# Patient Record
Sex: FEMALE | Race: BLACK OR AFRICAN AMERICAN | NOT HISPANIC OR LATINO | URBAN - METROPOLITAN AREA
[De-identification: names, ages, dates, MRNs, and addresses within clinical notes are randomized per-mention and may not be internally consistent; named-entity substitution may affect disease eponyms.]

---
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
## 2018-07-05 ENCOUNTER — TRANSCRIPTION ENCOUNTER (OUTPATIENT)
Age: 18
End: 2018-07-05

## 2018-12-21 ENCOUNTER — APPOINTMENT (OUTPATIENT)
Dept: ANTEPARTUM | Facility: CLINIC | Age: 18
End: 2018-12-21

## 2019-02-01 ENCOUNTER — APPOINTMENT (OUTPATIENT)
Age: 19
End: 2019-02-01

## 2019-02-15 ENCOUNTER — APPOINTMENT (OUTPATIENT)
Age: 19
End: 2019-02-15
Payer: MEDICAID

## 2019-02-15 ENCOUNTER — ASOB RESULT (OUTPATIENT)
Age: 19
End: 2019-02-15

## 2019-02-15 PROCEDURE — 76817 TRANSVAGINAL US OBSTETRIC: CPT

## 2019-02-15 PROCEDURE — 76811 OB US DETAILED SNGL FETUS: CPT

## 2019-02-24 ENCOUNTER — OUTPATIENT (OUTPATIENT)
Dept: INPATIENT UNIT | Facility: HOSPITAL | Age: 19
LOS: 1 days | End: 2019-02-24
Payer: MEDICAID

## 2019-02-24 VITALS — DIASTOLIC BLOOD PRESSURE: 53 MMHG | HEART RATE: 88 BPM | SYSTOLIC BLOOD PRESSURE: 99 MMHG

## 2019-02-24 VITALS — SYSTOLIC BLOOD PRESSURE: 118 MMHG | DIASTOLIC BLOOD PRESSURE: 54 MMHG | HEART RATE: 90 BPM

## 2019-02-24 DIAGNOSIS — O47.03 FALSE LABOR BEFORE 37 COMPLETED WEEKS OF GESTATION, THIRD TRIMESTER: ICD-10-CM

## 2019-02-24 PROCEDURE — G0463: CPT

## 2019-02-24 PROCEDURE — 59025 FETAL NON-STRESS TEST: CPT

## 2019-02-24 NOTE — OB PROVIDER TRIAGE NOTE - NSOBPROVIDERNOTE_OBGYN_ALL_OB_FT
17 y/o  at 23w1d evaluated for an isolated episode of vaginal spotting.   - FHR WNL   - no uterine activity on TOCO   - isolated incident with no other obstetric or general symptoms or recurrence     Will discharge patient home with precautions. Will follow up outpatient at next [facility] visit.     d/w Dr. Odell

## 2019-02-24 NOTE — OB PROVIDER TRIAGE NOTE - HISTORY OF PRESENT ILLNESS
19 y/o  at 23w1d presenting to labor and delivery with one isolated incident of vaginal spotting earlier today, she noticed it on tissue when wiping. Denies abdominal cramping/pain and leakage of fluid.   Prenatal course otherwise uncomplicated.     ObGynHx: eTOP x3  PMH/PSH: none  Allergies: NKDA  Meds: PNV

## 2019-02-24 NOTE — OB PROVIDER TRIAGE NOTE - NSHPPHYSICALEXAM_GEN_ALL_CORE
Vital Signs Last 24 Hrs  T(C): 36.8 (24 Feb 2019 17:30), Max: 36.8 (24 Feb 2019 17:30)  T(F): 98.2 (24 Feb 2019 17:30), Max: 98.2 (24 Feb 2019 17:30)  HR: 90 (24 Feb 2019 17:30) (90 - 90)  BP: 123/56 (24 Feb 2019 17:30) (118/54 - 123/56)  RR: 18 (24 Feb 2019 17:30) (18 - 18)    FHR: 144  [place]: no uterine activity

## 2019-03-01 ENCOUNTER — APPOINTMENT (OUTPATIENT)
Age: 19
End: 2019-03-01

## 2019-03-17 ENCOUNTER — OUTPATIENT (OUTPATIENT)
Dept: INPATIENT UNIT | Facility: HOSPITAL | Age: 19
LOS: 1 days | End: 2019-03-17
Payer: MEDICAID

## 2019-03-17 VITALS — DIASTOLIC BLOOD PRESSURE: 67 MMHG | SYSTOLIC BLOOD PRESSURE: 123 MMHG | HEART RATE: 110 BPM

## 2019-03-17 VITALS — SYSTOLIC BLOOD PRESSURE: 113 MMHG | HEART RATE: 98 BPM | DIASTOLIC BLOOD PRESSURE: 71 MMHG

## 2019-03-17 DIAGNOSIS — O47.02 FALSE LABOR BEFORE 37 COMPLETED WEEKS OF GESTATION, SECOND TRIMESTER: ICD-10-CM

## 2019-03-17 LAB
APPEARANCE UR: ABNORMAL
BACTERIA # UR AUTO: ABNORMAL
BILIRUB UR-MCNC: ABNORMAL
COLOR SPEC: YELLOW — SIGNIFICANT CHANGE UP
DIFF PNL FLD: ABNORMAL
EPI CELLS # UR: SIGNIFICANT CHANGE UP
GLUCOSE UR QL: NEGATIVE MG/DL — SIGNIFICANT CHANGE UP
KETONES UR-MCNC: ABNORMAL
LEUKOCYTE ESTERASE UR-ACNC: ABNORMAL
NITRITE UR-MCNC: POSITIVE
PH UR: 6 — SIGNIFICANT CHANGE UP (ref 5–8)
PROT UR-MCNC: 100 MG/DL
RBC CASTS # UR COMP ASSIST: NEGATIVE /HPF — SIGNIFICANT CHANGE UP (ref 0–4)
SP GR SPEC: 1.02 — SIGNIFICANT CHANGE UP (ref 1.01–1.02)
UROBILINOGEN FLD QL: 8 MG/DL
WBC UR QL: >50

## 2019-03-17 PROCEDURE — 59025 FETAL NON-STRESS TEST: CPT

## 2019-03-17 PROCEDURE — 81001 URINALYSIS AUTO W/SCOPE: CPT

## 2019-03-17 PROCEDURE — 87086 URINE CULTURE/COLONY COUNT: CPT

## 2019-03-17 PROCEDURE — 76775 US EXAM ABDO BACK WALL LIM: CPT | Mod: 26

## 2019-03-17 PROCEDURE — 76775 US EXAM ABDO BACK WALL LIM: CPT

## 2019-03-17 PROCEDURE — G0463: CPT

## 2019-03-17 PROCEDURE — 87186 SC STD MICRODIL/AGAR DIL: CPT

## 2019-03-17 RX ORDER — CEPHALEXIN 500 MG
1 CAPSULE ORAL
Qty: 28 | Refills: 0
Start: 2019-03-17 | End: 2019-03-23

## 2019-03-17 RX ORDER — SODIUM CHLORIDE 9 MG/ML
1000 INJECTION, SOLUTION INTRAVENOUS ONCE
Qty: 0 | Refills: 0 | Status: DISCONTINUED | OUTPATIENT
Start: 2019-03-17 | End: 2019-04-01

## 2019-03-17 NOTE — OB RN TRIAGE NOTE - NS_TRIAGEADDITIONAL COMMENTS_OBGYN_ALL_OB_FT
Pt complaining of abdominal pain. UA, Urine culture sent. Contractions noted, IV 1 liter bolus of LR infusing. [facility]. 1300 [facility], Mild Left hydronephrosis noted, Ua results Positive for UTI. Pt discharged home as per Dr Odell with RX for Keflex called in to pts pharmacy. Instruction for PTL and Antibiotic use gjiven. Pt states understanding of all.

## 2019-03-17 NOTE — OB RN TRIAGE NOTE - CHIEF COMPLAINT QUOTE
im having low abdominal pain x 3 days im having low abdominal pain x 3 days, also throwing up everytime she eats x 3 days

## 2019-03-20 ENCOUNTER — APPOINTMENT (OUTPATIENT)
Age: 19
End: 2019-03-20
Payer: MEDICAID

## 2019-03-20 ENCOUNTER — ASOB RESULT (OUTPATIENT)
Age: 19
End: 2019-03-20

## 2019-03-20 PROCEDURE — 76816 OB US FOLLOW-UP PER FETUS: CPT

## 2019-04-18 ENCOUNTER — ASOB RESULT (OUTPATIENT)
Age: 19
End: 2019-04-18

## 2019-04-18 ENCOUNTER — APPOINTMENT (OUTPATIENT)
Dept: ANTEPARTUM | Facility: CLINIC | Age: 19
End: 2019-04-18
Payer: MEDICAID

## 2019-04-18 PROCEDURE — 76816 OB US FOLLOW-UP PER FETUS: CPT

## 2019-04-18 PROCEDURE — 76819 FETAL BIOPHYS PROFIL W/O NST: CPT

## 2019-04-24 ENCOUNTER — APPOINTMENT (OUTPATIENT)
Age: 19
End: 2019-04-24

## 2019-05-08 ENCOUNTER — APPOINTMENT (OUTPATIENT)
Age: 19
End: 2019-05-08
Payer: MEDICAID

## 2019-05-08 ENCOUNTER — ASOB RESULT (OUTPATIENT)
Age: 19
End: 2019-05-08

## 2019-05-08 PROCEDURE — 76819 FETAL BIOPHYS PROFIL W/O NST: CPT

## 2019-05-08 PROCEDURE — 76816 OB US FOLLOW-UP PER FETUS: CPT

## 2019-06-05 ENCOUNTER — OUTPATIENT (OUTPATIENT)
Dept: INPATIENT UNIT | Facility: HOSPITAL | Age: 19
LOS: 1 days | End: 2019-06-05
Payer: MEDICAID

## 2019-06-05 VITALS
OXYGEN SATURATION: 97 % | TEMPERATURE: 98 F | DIASTOLIC BLOOD PRESSURE: 65 MMHG | RESPIRATION RATE: 18 BRPM | SYSTOLIC BLOOD PRESSURE: 110 MMHG | HEART RATE: 95 BPM

## 2019-06-05 VITALS — DIASTOLIC BLOOD PRESSURE: 65 MMHG | HEART RATE: 106 BPM | SYSTOLIC BLOOD PRESSURE: 110 MMHG | OXYGEN SATURATION: 97 %

## 2019-06-05 DIAGNOSIS — O47.03 FALSE LABOR BEFORE 37 COMPLETED WEEKS OF GESTATION, THIRD TRIMESTER: ICD-10-CM

## 2019-06-05 PROCEDURE — G0463: CPT

## 2019-06-05 PROCEDURE — 59025 FETAL NON-STRESS TEST: CPT

## 2019-06-05 PROCEDURE — 76819 FETAL BIOPHYS PROFIL W/O NST: CPT

## 2019-06-05 PROCEDURE — 76819 FETAL BIOPHYS PROFIL W/O NST: CPT | Mod: 26

## 2019-06-05 PROCEDURE — 76815 OB US LIMITED FETUS(S): CPT | Mod: 26

## 2019-06-05 PROCEDURE — 76815 OB US LIMITED FETUS(S): CPT

## 2019-06-05 NOTE — OB PROVIDER TRIAGE NOTE - HISTORY OF PRESENT ILLNESS
19yo  @ 36+6 by LMP 16 w/ anemia of pregnancy presents with decreased FM. Pt states that she did not feel baby until 3pm today, which is abnormal for baby (normally feels baby kicking ass soon as she wakes up). She states that she does has FM now, but denies vaginal bleeding, LOF, and contractions. Denies any falls or recent stressful events. No other complaints.   PMH: denies  PSH: denies  Meds: prenatal vitamins, Fe   Allergies: NKDA  Gyn Hx: normal PAPs, no cysts, no fibroids  OB Hx: 4 SAB as per patient

## 2019-06-05 NOTE — OB PROVIDER TRIAGE NOTE - NSOBPROVIDERNOTE_OBGYN_ALL_OB_FT
17yo  @ 36+6 by LMP 18 w/ anemia of pregnancy presents with decreased FM.  - FHT and toco  - BPP  - ice chips, repositioning 17yo  @ 36+6 by LMP 16 w/ anemia of pregnancy presents with decreased FM.  - FHT and toco  - BPP  - ice chips, repositioning    Attending  pt evaluated, FHR category 1  BPP   pt will be dc home for routine f/u

## 2019-06-21 ENCOUNTER — INPATIENT (INPATIENT)
Facility: HOSPITAL | Age: 19
LOS: 3 days | Discharge: HOME | DRG: 819 | End: 2019-06-25
Attending: OBSTETRICS & GYNECOLOGY | Admitting: OBSTETRICS & GYNECOLOGY
Payer: MEDICAID

## 2019-06-21 VITALS
WEIGHT: 250 LBS | HEIGHT: 62 IN | SYSTOLIC BLOOD PRESSURE: 130 MMHG | HEART RATE: 94 BPM | DIASTOLIC BLOOD PRESSURE: 77 MMHG | TEMPERATURE: 98 F | RESPIRATION RATE: 16 BRPM

## 2019-06-21 DIAGNOSIS — O26.893 OTHER SPECIFIED PREGNANCY RELATED CONDITIONS, THIRD TRIMESTER: ICD-10-CM

## 2019-06-21 DIAGNOSIS — Z3A.39 39 WEEKS GESTATION OF PREGNANCY: ICD-10-CM

## 2019-06-21 LAB
BASOPHILS # BLD AUTO: 0 K/UL — SIGNIFICANT CHANGE UP (ref 0–0.2)
BASOPHILS NFR BLD AUTO: 0.2 % — SIGNIFICANT CHANGE UP (ref 0–2)
BLD GP AB SCN SERPL QL: SIGNIFICANT CHANGE UP
EOSINOPHIL # BLD AUTO: 0.3 K/UL — SIGNIFICANT CHANGE UP (ref 0–0.5)
EOSINOPHIL NFR BLD AUTO: 3 % — SIGNIFICANT CHANGE UP (ref 0–6)
HCT VFR BLD CALC: 30.4 % — LOW (ref 37–47)
HGB BLD-MCNC: 9.4 G/DL — LOW (ref 12–16)
LYMPHOCYTES # BLD AUTO: 2 K/UL — SIGNIFICANT CHANGE UP (ref 1–4.8)
LYMPHOCYTES # BLD AUTO: 20.9 % — SIGNIFICANT CHANGE UP (ref 20–55)
MCHC RBC-ENTMCNC: 23.5 PG — LOW (ref 27–31)
MCHC RBC-ENTMCNC: 30.9 G/DL — LOW (ref 32–36)
MCV RBC AUTO: 76 FL — LOW (ref 81–99)
MONOCYTES # BLD AUTO: 0.7 K/UL — SIGNIFICANT CHANGE UP (ref 0–0.8)
MONOCYTES NFR BLD AUTO: 7.7 % — SIGNIFICANT CHANGE UP (ref 3–10)
NEUTROPHILS # BLD AUTO: 6.5 K/UL — SIGNIFICANT CHANGE UP (ref 1.8–8)
NEUTROPHILS NFR BLD AUTO: 67.7 % — SIGNIFICANT CHANGE UP (ref 37–73)
PLATELET # BLD AUTO: 356 K/UL — SIGNIFICANT CHANGE UP (ref 150–400)
RBC # BLD: 4 M/UL — LOW (ref 4.4–5.2)
RBC # FLD: 20 % — HIGH (ref 11–15.6)
TYPE + AB SCN PNL BLD: SIGNIFICANT CHANGE UP
WBC # BLD: 9.6 K/UL — SIGNIFICANT CHANGE UP (ref 4.8–10.8)
WBC # FLD AUTO: 9.6 K/UL — SIGNIFICANT CHANGE UP (ref 4.8–10.8)

## 2019-06-21 RX ORDER — CITRIC ACID/SODIUM CITRATE 300-500 MG
30 SOLUTION, ORAL ORAL ONCE
Refills: 0 | Status: DISCONTINUED | OUTPATIENT
Start: 2019-06-21 | End: 2019-06-23

## 2019-06-21 RX ORDER — BUTORPHANOL TARTRATE 2 MG/ML
1 INJECTION, SOLUTION INTRAMUSCULAR; INTRAVENOUS EVERY 4 HOURS
Refills: 0 | Status: DISCONTINUED | OUTPATIENT
Start: 2019-06-21 | End: 2019-06-25

## 2019-06-21 RX ORDER — BUTORPHANOL TARTRATE 2 MG/ML
2 INJECTION, SOLUTION INTRAMUSCULAR; INTRAVENOUS EVERY 4 HOURS
Refills: 0 | Status: DISCONTINUED | OUTPATIENT
Start: 2019-06-21 | End: 2019-06-25

## 2019-06-21 RX ORDER — AMPICILLIN TRIHYDRATE 250 MG
1 CAPSULE ORAL EVERY 4 HOURS
Refills: 0 | Status: DISCONTINUED | OUTPATIENT
Start: 2019-06-21 | End: 2019-06-23

## 2019-06-21 RX ORDER — OXYTOCIN 10 UNIT/ML
333.33 VIAL (ML) INJECTION
Qty: 20 | Refills: 0 | Status: DISCONTINUED | OUTPATIENT
Start: 2019-06-21 | End: 2019-06-25

## 2019-06-21 RX ORDER — SODIUM CHLORIDE 9 MG/ML
1000 INJECTION, SOLUTION INTRAVENOUS
Refills: 0 | Status: DISCONTINUED | OUTPATIENT
Start: 2019-06-21 | End: 2019-06-23

## 2019-06-21 RX ORDER — AMPICILLIN TRIHYDRATE 250 MG
2 CAPSULE ORAL ONCE
Refills: 0 | Status: COMPLETED | OUTPATIENT
Start: 2019-06-21 | End: 2019-06-21

## 2019-06-21 RX ADMIN — Medication 216 GRAM(S): at 22:56

## 2019-06-21 RX ADMIN — SODIUM CHLORIDE 125 MILLILITER(S): 9 INJECTION, SOLUTION INTRAVENOUS at 22:02

## 2019-06-21 NOTE — OB PROVIDER H&P - HISTORY OF PRESENT ILLNESS
17 y/o  at 39.1w presenting to labor and delivery for elective induction of labor. Pt denies contractions, vaginal bleeding, leakage of fluid. Pt endorses good fetal movement. No complaints at this time. ROS otherwise negative.   Prenatal course uncomplicated.     OBGYNHx: SAB x4 (, , 2016, 2017); denies ovarian cysts, fibroids, STD hx  PMH: denies   PSH: denies  Meds: PNVs  All: NKDA  SH: denies toxic habits x3

## 2019-06-21 NOTE — OB PROVIDER H&P - ASSESSMENT
17 y/o  at 39.1w being admitted for elective induction of labor.  -Admit  -Consent  -FHT and toco  -Admission labs  -GBS positive, will give ampicillin     D/w Dr. Odell

## 2019-06-21 NOTE — OB PROVIDER H&P - NSHPPHYSICALEXAM_GEN_ALL_CORE
Vital Signs Last 24 Hrs  T(C): 36.6 (21 Jun 2019 21:00), Max: 36.6 (21 Jun 2019 21:00)  T(F): 97.9 (21 Jun 2019 21:00), Max: 97.9 (21 Jun 2019 21:00)  HR: 94 (21 Jun 2019 21:00) (94 - 94)  BP: 130/77 (21 Jun 2019 21:00) (130/77 - 130/77)  RR: 16 (21 Jun 2019 21:00) (16 - 16)    Gen: well-appearing, NAD  Abd: soft, NT, ND, gravid     FHT: baseline 135, moderate variability, +accels, no decels, category 1   [place]: sadi irregularly   SVE:  Bedside sono:

## 2019-06-22 LAB
HIV 1 & 2 AB SERPL IA.RAPID: SIGNIFICANT CHANGE UP
HIV 1+2 AB+HIV1 P24 AG SERPL QL IA: SIGNIFICANT CHANGE UP

## 2019-06-22 RX ORDER — SODIUM CHLORIDE 9 MG/ML
1000 INJECTION, SOLUTION INTRAVENOUS
Refills: 0 | Status: DISCONTINUED | OUTPATIENT
Start: 2019-06-22 | End: 2019-06-25

## 2019-06-22 RX ADMIN — Medication 108 GRAM(S): at 07:01

## 2019-06-22 RX ADMIN — SODIUM CHLORIDE 125 MILLILITER(S): 9 INJECTION, SOLUTION INTRAVENOUS at 09:52

## 2019-06-22 RX ADMIN — Medication 108 GRAM(S): at 19:00

## 2019-06-22 RX ADMIN — Medication 108 GRAM(S): at 11:04

## 2019-06-22 RX ADMIN — Medication 108 GRAM(S): at 23:00

## 2019-06-22 RX ADMIN — Medication 108 GRAM(S): at 15:00

## 2019-06-22 RX ADMIN — SODIUM CHLORIDE 125 MILLILITER(S): 9 INJECTION, SOLUTION INTRAVENOUS at 02:10

## 2019-06-22 RX ADMIN — Medication 108 GRAM(S): at 03:00

## 2019-06-22 NOTE — CHART NOTE - NSCHARTNOTEFT_GEN_A_CORE
S:   Patient doing well, resting comfortably     O:     Vital Signs Last 24 Hrs  T(C): 36.5 (22 Jun 2019 02:28), Max: 36.7 (21 Jun 2019 23:40)  T(F): 97.7 (22 Jun 2019 02:28), Max: 98.06 (21 Jun 2019 23:40)  HR: 90 (22 Jun 2019 02:31) (75 - 94)  BP: 118/72 (22 Jun 2019 02:31) (118/72 - 130/77)  RR: 17 (22 Jun 2019 02:28) (16 - 17)      Abdomen: soft, nontender     FHT: baseline 140s, minimal variability, no accelerations at this time, few isolated variable decelerations     [place]: ctx irregular     A/P   FHT cat 2. Patient receiving D5LR at 125cc/hr. Will give additional bolus of fluid. Continue O2 and maternal position changes. Will hold next dose of cytotec for now.     Dr. Odell aware.

## 2019-06-22 NOTE — CHART NOTE - NSCHARTNOTEFT_GEN_A_CORE
pt received an epi  FHR category 1  cervix is now 4-5cm, 100 percent, -1  expectant mgmt for now, I will begin pitocin if there is no significant change within a few hours

## 2019-06-22 NOTE — CHART NOTE - NSCHARTNOTEFT_GEN_A_CORE
19 yo  here at 39 2/7 wks for elective IOL with cytotec. About to receive her first dose of 60 mcg cytotec PO.     Vital Signs Last 24 Hrs  T(C): 36.9 (2019 06:14), Max: 36.9 (2019 06:14)  T(F): 98.42 (2019 06:14), Max: 98.42 (2019 06:14)  HR: 76 (2019 11:36) (75 - 94)  BP: 134/78 (2019 11:36) (99/56 - 134/78)  BP(mean): --  RR: 18 (2019 06:14) (16 - 18)  SpO2: --    FETAL HEART RATE: 135/mod ramakrishna/+accels/ no decels    [place]: irregular contractions    PAIN SCALE (0-10): 0, no epidural    IMPRESSION: pt sadi painlessly with cytotec. FHT cat 1. Continue cytotec induction.

## 2019-06-22 NOTE — CHART NOTE - NSCHARTNOTEFT_GEN_A_CORE
Pt s/p epidural. Had multiple decels after epidural was placed and FSE was placed for discontinuous tracing. .     Vital Signs Last 24 Hrs  T(C): 36.9 (22 Jun 2019 06:14), Max: 36.9 (22 Jun 2019 06:14)  T(F): 98.42 (22 Jun 2019 06:14), Max: 98.42 (22 Jun 2019 06:14)  HR: 70 (22 Jun 2019 20:35) (67 - 155)  BP: 106/55 (22 Jun 2019 20:35) (99/56 - 149/87)  RR: 18 (22 Jun 2019 06:14) (16 - 18)  SpO2: 100% (22 Jun 2019 20:36) (89% - 100%)    FETAL HEART RATE: 150/min ramakrishna/ decels - unable to assess correlation to contractions because of discontinuous tracing and pt repositioning   [place]: q2 min      IMPRESSION: Pt with min ramakrishna but recovered after epidural was placed. Switching her over to d5LR for alternating. She is in lateal position with oxygen on. Will continue to monitor closely.      d/w Dr. Odell

## 2019-06-22 NOTE — CHART NOTE - NSCHARTNOTEFT_GEN_A_CORE
Pt is uncomfortable, complaining of pain with contractions, requesting Epidural Anesthesia to be restarted.  Vital Signs Last 24 Hrs  T(C): 37.0 (22 Jun 2019 22:19), Max: 37.0 (22 Jun 2019 22:19)  T(F): 98.6 (22 Jun 2019 22:19), Max: 98.6 (22 Jun 2019 22:19)  HR: 98 (22 Jun 2019 23:41) (67 - 155)  BP: 133/79 (22 Jun 2019 23:40) (95/46 - 149/87)  RR: 17 (22 Jun 2019 22:19) (17 - 18)  SpO2: 99% (22 Jun 2019 23:41) (89% - 100%)    FETAL HEART RATE: 150/minimal variability, no accels no decels, category 2    [place]: contractions q 2-3 min    CERVICAL EXAM: 6-7/100/-1    PAIN SCALE (0-10): 6-7/10    IMPRESSION: Category 2 tracing, about 30 min ago the tracing was shortly moderate variability, and returned to minimal variability. will monitor closely. Pt is being actively resuscitated with O2 via nonrebreather mask, D5LR IV infusion, position changes. Will continue the current management for ad additional 30 min if no improvement consider change of management plan.  Dr. Odell made aware.

## 2019-06-22 NOTE — CHART NOTE - NSCHARTNOTEFT_GEN_A_CORE
overnight pt had decreased variability  cytotec was held, now restarted  irreg ctx noted  cytotec will continue

## 2019-06-23 ENCOUNTER — TRANSCRIPTION ENCOUNTER (OUTPATIENT)
Age: 19
End: 2019-06-23

## 2019-06-23 LAB
HCT VFR BLD CALC: 25.6 % — LOW (ref 37–47)
HGB BLD-MCNC: 7.8 G/DL — LOW (ref 12–16)
MEV IGG SER-ACNC: 30.2 AU/ML — SIGNIFICANT CHANGE UP
MEV IGG+IGM SER-IMP: POSITIVE — SIGNIFICANT CHANGE UP
T PALLIDUM AB TITR SER: NEGATIVE — SIGNIFICANT CHANGE UP

## 2019-06-23 RX ORDER — ACETAMINOPHEN 500 MG
975 TABLET ORAL
Refills: 0 | Status: DISCONTINUED | OUTPATIENT
Start: 2019-06-23 | End: 2019-06-25

## 2019-06-23 RX ORDER — LANOLIN
1 OINTMENT (GRAM) TOPICAL EVERY 6 HOURS
Refills: 0 | Status: DISCONTINUED | OUTPATIENT
Start: 2019-06-23 | End: 2019-06-25

## 2019-06-23 RX ORDER — AER TRAVELER 0.5 G/1
1 SOLUTION RECTAL; TOPICAL EVERY 4 HOURS
Refills: 0 | Status: DISCONTINUED | OUTPATIENT
Start: 2019-06-23 | End: 2019-06-25

## 2019-06-23 RX ORDER — DIPHENHYDRAMINE HCL 50 MG
25 CAPSULE ORAL EVERY 6 HOURS
Refills: 0 | Status: DISCONTINUED | OUTPATIENT
Start: 2019-06-23 | End: 2019-06-25

## 2019-06-23 RX ORDER — OXYTOCIN 10 UNIT/ML
333.33 VIAL (ML) INJECTION
Qty: 20 | Refills: 0 | Status: DISCONTINUED | OUTPATIENT
Start: 2019-06-23 | End: 2019-06-25

## 2019-06-23 RX ORDER — MAGNESIUM HYDROXIDE 400 MG/1
30 TABLET, CHEWABLE ORAL
Refills: 0 | Status: DISCONTINUED | OUTPATIENT
Start: 2019-06-23 | End: 2019-06-25

## 2019-06-23 RX ORDER — DOCUSATE SODIUM 100 MG
100 CAPSULE ORAL
Refills: 0 | Status: DISCONTINUED | OUTPATIENT
Start: 2019-06-23 | End: 2019-06-25

## 2019-06-23 RX ORDER — KETOROLAC TROMETHAMINE 30 MG/ML
30 SYRINGE (ML) INJECTION ONCE
Refills: 0 | Status: DISCONTINUED | OUTPATIENT
Start: 2019-06-23 | End: 2019-06-25

## 2019-06-23 RX ORDER — TETANUS TOXOID, REDUCED DIPHTHERIA TOXOID AND ACELLULAR PERTUSSIS VACCINE, ADSORBED 5; 2.5; 8; 8; 2.5 [IU]/.5ML; [IU]/.5ML; UG/.5ML; UG/.5ML; UG/.5ML
0.5 SUSPENSION INTRAMUSCULAR ONCE
Refills: 0 | Status: DISCONTINUED | OUTPATIENT
Start: 2019-06-23 | End: 2019-06-25

## 2019-06-23 RX ORDER — PRAMOXINE HYDROCHLORIDE 150 MG/15G
1 AEROSOL, FOAM RECTAL EVERY 4 HOURS
Refills: 0 | Status: DISCONTINUED | OUTPATIENT
Start: 2019-06-23 | End: 2019-06-25

## 2019-06-23 RX ORDER — OXYCODONE HYDROCHLORIDE 5 MG/1
5 TABLET ORAL ONCE
Refills: 0 | Status: DISCONTINUED | OUTPATIENT
Start: 2019-06-23 | End: 2019-06-25

## 2019-06-23 RX ORDER — IBUPROFEN 200 MG
600 TABLET ORAL EVERY 6 HOURS
Refills: 0 | Status: DISCONTINUED | OUTPATIENT
Start: 2019-06-23 | End: 2019-06-25

## 2019-06-23 RX ORDER — GLYCERIN ADULT
1 SUPPOSITORY, RECTAL RECTAL AT BEDTIME
Refills: 0 | Status: DISCONTINUED | OUTPATIENT
Start: 2019-06-23 | End: 2019-06-25

## 2019-06-23 RX ORDER — BENZOCAINE 10 %
1 GEL (GRAM) MUCOUS MEMBRANE EVERY 6 HOURS
Refills: 0 | Status: DISCONTINUED | OUTPATIENT
Start: 2019-06-23 | End: 2019-06-25

## 2019-06-23 RX ORDER — DIBUCAINE 1 %
1 OINTMENT (GRAM) RECTAL EVERY 6 HOURS
Refills: 0 | Status: DISCONTINUED | OUTPATIENT
Start: 2019-06-23 | End: 2019-06-25

## 2019-06-23 RX ORDER — OXYCODONE HYDROCHLORIDE 5 MG/1
5 TABLET ORAL
Refills: 0 | Status: DISCONTINUED | OUTPATIENT
Start: 2019-06-23 | End: 2019-06-25

## 2019-06-23 RX ORDER — SODIUM CHLORIDE 9 MG/ML
3 INJECTION INTRAMUSCULAR; INTRAVENOUS; SUBCUTANEOUS EVERY 8 HOURS
Refills: 0 | Status: DISCONTINUED | OUTPATIENT
Start: 2019-06-23 | End: 2019-06-25

## 2019-06-23 RX ORDER — IBUPROFEN 200 MG
600 TABLET ORAL EVERY 6 HOURS
Refills: 0 | Status: COMPLETED | OUTPATIENT
Start: 2019-06-23 | End: 2020-05-21

## 2019-06-23 RX ORDER — SIMETHICONE 80 MG/1
80 TABLET, CHEWABLE ORAL EVERY 4 HOURS
Refills: 0 | Status: DISCONTINUED | OUTPATIENT
Start: 2019-06-23 | End: 2019-06-25

## 2019-06-23 RX ORDER — HYDROCORTISONE 1 %
1 OINTMENT (GRAM) TOPICAL EVERY 6 HOURS
Refills: 0 | Status: DISCONTINUED | OUTPATIENT
Start: 2019-06-23 | End: 2019-06-25

## 2019-06-23 RX ADMIN — Medication 100 MILLIGRAM(S): at 12:05

## 2019-06-23 RX ADMIN — Medication 1 TABLET(S): at 12:06

## 2019-06-23 RX ADMIN — Medication 1000 MILLIUNIT(S)/MIN: at 04:07

## 2019-06-23 RX ADMIN — Medication 975 MILLIGRAM(S): at 22:00

## 2019-06-23 RX ADMIN — OXYCODONE HYDROCHLORIDE 5 MILLIGRAM(S): 5 TABLET ORAL at 09:14

## 2019-06-23 RX ADMIN — Medication 108 GRAM(S): at 02:56

## 2019-06-23 RX ADMIN — Medication 600 MILLIGRAM(S): at 12:06

## 2019-06-23 RX ADMIN — Medication 975 MILLIGRAM(S): at 21:10

## 2019-06-23 RX ADMIN — OXYCODONE HYDROCHLORIDE 5 MILLIGRAM(S): 5 TABLET ORAL at 08:14

## 2019-06-23 RX ADMIN — Medication 600 MILLIGRAM(S): at 13:06

## 2019-06-23 NOTE — DISCHARGE NOTE OB - CARE PROVIDER_API CALL
Tommie Odell)  Obstetrics and Gynecology  [address] [place] [address], [address] [place] [address]  Phone: [phone]  Fax: [phone]  Follow Up Time:

## 2019-06-23 NOTE — OB NEONATOLOGY/PEDIATRICIAN DELIVERY SUMMARY - NSPEDSNEONOTESA_OBGYN_ALL_OB_FT
Called to evaluate this 39.3 week  born to a 19 yo  A+, HIV neg, GBS +, RPR NR, RI, Hep B neg via  for elective IOL.  Baby was noted to have poor respiratory effort, low tone and and poor color at birth which required PPV for about 20 seconds.  I arrived at about 3 minutes of life at which point the baby had oxygen saturations from 75-80% on RA and HR >140 with strong cry and active.  He was noted to have copious secretions and was deep suctioned via mouth and nares.  AS 6,9.  3 vessel cord.  Baby left in stable condition with the parents and L&D staff.  Will closely monitor respiratory status.

## 2019-06-23 NOTE — CHART NOTE - NSCHARTNOTEFT_GEN_A_CORE
FHR category 1  epidural topoff received  last exam was 9cm  pt will be permitted to continue the present mgmt

## 2019-06-23 NOTE — DISCHARGE NOTE OB - PLAN OF CARE
rapid recovery Patient can transition to regular activity level and continue regular diet. Patient should follow up with Dr. Odell's office to schedule post partum visit. Patient should contact doctor earlier should she develop persistent/increased vaginal bleeding or fever.

## 2019-06-23 NOTE — CHART NOTE - NSCHARTNOTEFT_GEN_A_CORE
Pt vomiting.     Vital Signs Last 24 Hrs  T(C): 37.0 (22 Jun 2019 22:19), Max: 37.0 (22 Jun 2019 22:19)  T(F): 98.6 (22 Jun 2019 22:19), Max: 98.6 (22 Jun 2019 22:19)  HR: 122 (23 Jun 2019 00:16) (67 - 155)  BP: 128/72 (22 Jun 2019 23:55) (95/46 - 149/87)  BP(mean): --  RR: 17 (22 Jun 2019 22:19) (17 - 18)  SpO2: 96% (23 Jun 2019 00:16) (89% - 100%)    FETAL HEART RATE:150/equal portions of moderate and minimal ramakrishna/ 1 accel/ 1 ramakrishna decel  [place]: q2-4 min      IMPRESSION: Accel and return of moderate variability reassuring. Will continue to monitor.

## 2019-06-23 NOTE — DISCHARGE NOTE OB - MATERIALS PROVIDED
Birth Certificate Instructions/[facility] Hearing Screen Program/Back To Sleep Handout/Shaken Baby Prevention Handout/  Immunization Record/Breastfeeding Guide and Packet/[facility]  Screening Program/Breastfeeding Log/Breastfeeding Mother’s Support Group Information/Guide to Postpartum Care/Vaccinations

## 2019-06-23 NOTE — DISCHARGE NOTE OB - MEDICATION SUMMARY - MEDICATIONS TO TAKE
I will START or STAY ON the medications listed below when I get home from the hospital:    ibuprofen 600 mg oral tablet  -- 1 tab(s) by mouth every 6 hours, As Needed -for mild pain - for moderate pain   -- Indication: For Moderate pain

## 2019-06-23 NOTE — OB PROVIDER DELIVERY SUMMARY - NSPROVIDERDELIVERYNOTE_OBGYN_ALL_OB_FT
, viable infant over a midline 2nd degree laceration  pt pushed well  atraumatic shoulder delivery  cord clamp delayed 20 seconds  infant handed off for skin to skin  robinson called due to the response not being normal as per nursing  apgars 6-9  spontaneous placenta delivery  excellent hemostasis  pt requested that no sutures be placed  she was counseled about the theoretical risk of bleeding

## 2019-06-23 NOTE — DISCHARGE NOTE OB - PATIENT PORTAL LINK FT
You can access the [facility] Patient Portal, offered by [facility], by registering with the following website: [email or web address]

## 2019-06-23 NOTE — DISCHARGE NOTE OB - HOSPITAL COURSE
19 y/o  now PPD#2 s/p normal spontaneous vaginal delivery. Patient transferred to post partum unit, uncomplicated hospital course. At the time of discharge patient was tolerating regular diet PO, ambulating, voiding, and having bowel movements and flatus. Pain well controlled with pain medications PRN.

## 2019-06-23 NOTE — DISCHARGE NOTE OB - CARE PLAN
Principal Discharge DX:	 (normal spontaneous vaginal delivery)  Goal:	rapid recovery  Assessment and plan of treatment:	Patient can transition to regular activity level and continue regular diet. Patient should follow up with Dr. Odell's office to schedule post partum visit. Patient should contact doctor earlier should she develop persistent/increased vaginal bleeding or fever.

## 2019-06-24 RX ADMIN — Medication 600 MILLIGRAM(S): at 12:01

## 2019-06-24 RX ADMIN — Medication 600 MILLIGRAM(S): at 13:00

## 2019-06-24 RX ADMIN — Medication 1 TABLET(S): at 12:01

## 2019-06-24 NOTE — PROGRESS NOTE ADULT - SUBJECTIVE AND OBJECTIVE BOX
Name: AKILA BOWEN  MRN: [id number]  Date Admitted: 19  Location: [facility] 2014 ([facility] 2EST)  Attending: Tommie Odell, Tommie Shah    All: No Known Allergies    Post Partum: Vaginal Delivery Progress Note    AKILA BOWEN is a 18y  s/p  PPD #1 of a viable male infant.     SUBJECTIVE:  No acute events overnight. Pain is well controlled with PRN pain medication. No problems with ambulating, voiding, or PO intake. Has had flatus but no BM. Denies N/V. Patient is having normal lochia which is decreasing.    She is bottlefeeding due to personal preference.    OBJECTIVE:  Physical exam:  General: AOx3, NAD.  Abdomen: Soft, appropriately tender to palpitation, firm uterine fundus at umbilicus.  Ext: No DVT signs, warm extremities.    Vital Signs Last 24 Hrs  T(C): 36.9 (2019 20:36), Max: 37 (2019 06:32)  T(F): 98.4 (2019 20:36), Max: 98.6 (2019 06:32)  HR: 95 (2019 20:36) (95 - 99)  BP: 111/57 (2019 20:36) (99/66 - 116/71)  RR: 20 (2019 20:36) (20 - 20)  SpO2: 98% (2019 20:36) (96% - 98%)    LABS:                        7.8    x     )-----------( x        ( 2019 18:32 )             25.6

## 2019-06-25 VITALS
SYSTOLIC BLOOD PRESSURE: 111 MMHG | DIASTOLIC BLOOD PRESSURE: 74 MMHG | HEART RATE: 80 BPM | TEMPERATURE: 99 F | RESPIRATION RATE: 18 BRPM

## 2019-06-25 RX ORDER — IBUPROFEN 200 MG
1 TABLET ORAL
Qty: 30 | Refills: 0
Start: 2019-06-25

## 2019-06-25 RX ADMIN — Medication 1 TABLET(S): at 11:57

## 2019-06-25 RX ADMIN — Medication 600 MILLIGRAM(S): at 11:57

## 2019-06-25 RX ADMIN — SODIUM CHLORIDE 3 MILLILITER(S): 9 INJECTION INTRAMUSCULAR; INTRAVENOUS; SUBCUTANEOUS at 06:16

## 2019-06-25 RX ADMIN — Medication 600 MILLIGRAM(S): at 12:45

## 2019-06-25 NOTE — PROGRESS NOTE ADULT - ASSESSMENT
18y  s/p  PPD #2 of a viable male infant.     -Encourage PO intake and early ambulation  -PO pain meds PRN  -D/C on PPD day 2

## 2019-06-25 NOTE — PROGRESS NOTE ADULT - SUBJECTIVE AND OBJECTIVE BOX
Name: AKILA BOWEN  MRN: [id number]  Date Admitted: 17  Location: [facility] 2E2014 ([facility] 2EST)  Attending: Tommie Odell      All: No Known Allergies    Post Partum: Vaginal Delivery Progress Note    AKILA BOWEN is a 18y  s/p  PPD #2 of a viable male infant.     SUBJECTIVE:  No acute events overnight. Pain is well controlled with PRN pain medication. No problems with ambulating, voiding, or PO intake. Has had flatus but no BM. Denies N/V. Patient is having normal lochia which is decreasing.  She is bottlefeeding due to personal preference. No breast tenderness or engorgement.     OBJECTIVE:  Physical exam:  General: AOx3, NAD.  Abdomen: Soft, appropriately tender to palpitation, firm uterine fundus at umbilicus.  Ext: No DVT signs, warm extremities.    Vital Signs Last 24 Hrs  T(C): 36.8 (2019 19:58), Max: 36.8 (2019 19:58)  T(F): 98.2 (2019 19:58), Max: 98.2 (2019 19:58)  HR: 82 (2019 08:08) (82 - 82)  BP: 111/61 (2019 19:58) (103/58 - 111/61)  RR: 18 (2019 19:58) (18 - 18)    LABS:             CBC Full  -  ( 2019 18:32 )  WBC Count : x  RBC Count : x  Hemoglobin : 7.8 g/dL  Hematocrit : 25.6 %  Platelet Count - Automated : x  Mean Cell Volume : x  Mean Cell Hemoglobin : x  Mean Cell Hemoglobin Concentration : x  Auto Neutrophil # : x  Auto Lymphocyte # : x  Auto Monocyte # : x  Auto Eosinophil # : x  Auto Basophil # : x  Auto Neutrophil % : x  Auto Lymphocyte % : x  Auto Monocyte % : x  Auto Eosinophil % : x  Auto Basophil % : x

## 2019-07-10 PROCEDURE — 86780 TREPONEMA PALLIDUM: CPT

## 2019-07-10 PROCEDURE — 59025 FETAL NON-STRESS TEST: CPT

## 2019-07-10 PROCEDURE — 86765 RUBEOLA ANTIBODY: CPT

## 2019-07-10 PROCEDURE — 36415 COLL VENOUS BLD VENIPUNCTURE: CPT

## 2019-07-10 PROCEDURE — 86850 RBC ANTIBODY SCREEN: CPT

## 2019-07-10 PROCEDURE — 86900 BLOOD TYPING SEROLOGIC ABO: CPT

## 2019-07-10 PROCEDURE — 86703 HIV-1/HIV-2 1 RESULT ANTBDY: CPT

## 2019-07-10 PROCEDURE — 85014 HEMATOCRIT: CPT

## 2019-07-10 PROCEDURE — G0463: CPT

## 2019-07-10 PROCEDURE — 85027 COMPLETE CBC AUTOMATED: CPT

## 2019-07-10 PROCEDURE — 86901 BLOOD TYPING SEROLOGIC RH(D): CPT

## 2019-07-10 PROCEDURE — 85018 HEMOGLOBIN: CPT

## 2019-07-10 PROCEDURE — 87389 HIV-1 AG W/HIV-1&-2 AB AG IA: CPT

## 2019-07-10 PROCEDURE — 59050 FETAL MONITOR W/REPORT: CPT

## 2019-11-10 NOTE — OB PROVIDER DELIVERY SUMMARY - NSVAGDELIVERYA_OBGYN_ALL_OB
2
Spontaneous
Class I (easy) - visualization of the soft palate, fauces, uvula, and both anterior and posterior pillars

## 2020-01-15 ENCOUNTER — EMERGENCY (EMERGENCY)
Facility: HOSPITAL | Age: 20
LOS: 1 days | Discharge: HOME | End: 2020-01-15
Attending: EMERGENCY MEDICINE
Payer: MEDICAID

## 2020-01-15 VITALS
DIASTOLIC BLOOD PRESSURE: 76 MMHG | RESPIRATION RATE: 16 BRPM | SYSTOLIC BLOOD PRESSURE: 112 MMHG | HEIGHT: 62 IN | TEMPERATURE: 98 F | WEIGHT: 160.06 LBS | OXYGEN SATURATION: 100 % | HEART RATE: 87 BPM

## 2020-01-15 LAB
ALBUMIN SERPL ELPH-MCNC: 3.7 G/DL — SIGNIFICANT CHANGE UP (ref 3.3–5.2)
ALP SERPL-CCNC: 113 U/L — SIGNIFICANT CHANGE UP (ref 40–120)
ALT FLD-CCNC: 24 U/L — SIGNIFICANT CHANGE UP
ANION GAP SERPL CALC-SCNC: 10 MMOL/L — SIGNIFICANT CHANGE UP (ref 5–17)
ANISOCYTOSIS BLD QL: SLIGHT — SIGNIFICANT CHANGE UP
APPEARANCE UR: ABNORMAL
AST SERPL-CCNC: 17 U/L — SIGNIFICANT CHANGE UP
BACTERIA # UR AUTO: ABNORMAL
BASOPHILS # BLD AUTO: 0.09 K/UL — SIGNIFICANT CHANGE UP (ref 0–0.2)
BASOPHILS NFR BLD AUTO: 0.8 % — SIGNIFICANT CHANGE UP (ref 0–2)
BILIRUB SERPL-MCNC: <0.2 MG/DL — LOW (ref 0.4–2)
BILIRUB UR-MCNC: NEGATIVE — SIGNIFICANT CHANGE UP
BLD GP AB SCN SERPL QL: SIGNIFICANT CHANGE UP
BUN SERPL-MCNC: 10 MG/DL — SIGNIFICANT CHANGE UP (ref 8–20)
CALCIUM SERPL-MCNC: 9.3 MG/DL — SIGNIFICANT CHANGE UP (ref 8.6–10.2)
CHLORIDE SERPL-SCNC: 101 MMOL/L — SIGNIFICANT CHANGE UP (ref 98–107)
CO2 SERPL-SCNC: 24 MMOL/L — SIGNIFICANT CHANGE UP (ref 22–29)
COLOR SPEC: YELLOW — SIGNIFICANT CHANGE UP
CREAT SERPL-MCNC: 0.59 MG/DL — SIGNIFICANT CHANGE UP (ref 0.5–1.3)
DIFF PNL FLD: ABNORMAL
EOSINOPHIL # BLD AUTO: 0.39 K/UL — SIGNIFICANT CHANGE UP (ref 0–0.5)
EOSINOPHIL NFR BLD AUTO: 3.5 % — SIGNIFICANT CHANGE UP (ref 0–6)
EPI CELLS # UR: SIGNIFICANT CHANGE UP
GIANT PLATELETS BLD QL SMEAR: PRESENT — SIGNIFICANT CHANGE UP
GLUCOSE SERPL-MCNC: 95 MG/DL — SIGNIFICANT CHANGE UP (ref 70–115)
GLUCOSE UR QL: NEGATIVE MG/DL — SIGNIFICANT CHANGE UP
HCG SERPL-ACNC: HIGH MIU/ML
HCT VFR BLD CALC: 31.4 % — LOW (ref 34.5–45)
HGB BLD-MCNC: 9.5 G/DL — LOW (ref 11.5–15.5)
KETONES UR-MCNC: NEGATIVE — SIGNIFICANT CHANGE UP
LEUKOCYTE ESTERASE UR-ACNC: ABNORMAL
LYMPHOCYTES # BLD AUTO: 2.88 K/UL — SIGNIFICANT CHANGE UP (ref 1–3.3)
LYMPHOCYTES # BLD AUTO: 26.1 % — SIGNIFICANT CHANGE UP (ref 13–44)
MANUAL SMEAR VERIFICATION: SIGNIFICANT CHANGE UP
MCHC RBC-ENTMCNC: 22.7 PG — LOW (ref 27–34)
MCHC RBC-ENTMCNC: 30.3 GM/DL — LOW (ref 32–36)
MCV RBC AUTO: 74.9 FL — LOW (ref 80–100)
MONOCYTES # BLD AUTO: 0.19 K/UL — SIGNIFICANT CHANGE UP (ref 0–0.9)
MONOCYTES NFR BLD AUTO: 1.7 % — LOW (ref 2–14)
MYELOCYTES NFR BLD: 0.9 % — HIGH (ref 0–0)
NEUTROPHILS # BLD AUTO: 7.29 K/UL — SIGNIFICANT CHANGE UP (ref 1.8–7.4)
NEUTROPHILS NFR BLD AUTO: 65.2 % — SIGNIFICANT CHANGE UP (ref 43–77)
NEUTS BAND # BLD: 0.9 % — SIGNIFICANT CHANGE UP (ref 0–8)
NITRITE UR-MCNC: NEGATIVE — SIGNIFICANT CHANGE UP
PH UR: 6 — SIGNIFICANT CHANGE UP (ref 5–8)
PLAT MORPH BLD: NORMAL — SIGNIFICANT CHANGE UP
PLATELET # BLD AUTO: 513 K/UL — HIGH (ref 150–400)
POLYCHROMASIA BLD QL SMEAR: SIGNIFICANT CHANGE UP
POTASSIUM SERPL-MCNC: 3.8 MMOL/L — SIGNIFICANT CHANGE UP (ref 3.5–5.3)
POTASSIUM SERPL-SCNC: 3.8 MMOL/L — SIGNIFICANT CHANGE UP (ref 3.5–5.3)
PROT SERPL-MCNC: 7.2 G/DL — SIGNIFICANT CHANGE UP (ref 6.6–8.7)
PROT UR-MCNC: 30 MG/DL
RBC # BLD: 4.19 M/UL — SIGNIFICANT CHANGE UP (ref 3.8–5.2)
RBC # FLD: 18.9 % — HIGH (ref 10.3–14.5)
RBC BLD AUTO: ABNORMAL
RBC CASTS # UR COMP ASSIST: ABNORMAL /HPF (ref 0–4)
SODIUM SERPL-SCNC: 135 MMOL/L — SIGNIFICANT CHANGE UP (ref 135–145)
SP GR SPEC: 1.01 — SIGNIFICANT CHANGE UP (ref 1.01–1.02)
UROBILINOGEN FLD QL: 1 MG/DL
VARIANT LYMPHS # BLD: 0.9 % — SIGNIFICANT CHANGE UP (ref 0–6)
WBC # BLD: 11.03 K/UL — HIGH (ref 3.8–10.5)
WBC # FLD AUTO: 11.03 K/UL — HIGH (ref 3.8–10.5)
WBC UR QL: ABNORMAL

## 2020-01-15 PROCEDURE — 76705 ECHO EXAM OF ABDOMEN: CPT

## 2020-01-15 PROCEDURE — 80053 COMPREHEN METABOLIC PANEL: CPT

## 2020-01-15 PROCEDURE — 76815 OB US LIMITED FETUS(S): CPT | Mod: 26

## 2020-01-15 PROCEDURE — 99284 EMERGENCY DEPT VISIT MOD MDM: CPT

## 2020-01-15 PROCEDURE — 81001 URINALYSIS AUTO W/SCOPE: CPT

## 2020-01-15 PROCEDURE — 76815 OB US LIMITED FETUS(S): CPT

## 2020-01-15 PROCEDURE — 87086 URINE CULTURE/COLONY COUNT: CPT

## 2020-01-15 PROCEDURE — 76705 ECHO EXAM OF ABDOMEN: CPT | Mod: 26

## 2020-01-15 PROCEDURE — 86850 RBC ANTIBODY SCREEN: CPT

## 2020-01-15 PROCEDURE — 76801 OB US < 14 WKS SINGLE FETUS: CPT

## 2020-01-15 PROCEDURE — 86900 BLOOD TYPING SEROLOGIC ABO: CPT

## 2020-01-15 PROCEDURE — 84702 CHORIONIC GONADOTROPIN TEST: CPT

## 2020-01-15 PROCEDURE — 85027 COMPLETE CBC AUTOMATED: CPT

## 2020-01-15 PROCEDURE — 36415 COLL VENOUS BLD VENIPUNCTURE: CPT

## 2020-01-15 PROCEDURE — 86901 BLOOD TYPING SEROLOGIC RH(D): CPT

## 2020-01-15 PROCEDURE — 76801 OB US < 14 WKS SINGLE FETUS: CPT | Mod: 26

## 2020-01-15 RX ORDER — CEPHALEXIN 500 MG
500 CAPSULE ORAL ONCE
Refills: 0 | Status: COMPLETED | OUTPATIENT
Start: 2020-01-15 | End: 2020-01-15

## 2020-01-15 RX ORDER — SODIUM CHLORIDE 9 MG/ML
1000 INJECTION INTRAMUSCULAR; INTRAVENOUS; SUBCUTANEOUS ONCE
Refills: 0 | Status: COMPLETED | OUTPATIENT
Start: 2020-01-15 | End: 2020-01-15

## 2020-01-15 RX ORDER — CEPHALEXIN 500 MG
1 CAPSULE ORAL
Qty: 14 | Refills: 0
Start: 2020-01-15 | End: 2020-01-21

## 2020-01-15 RX ADMIN — SODIUM CHLORIDE 1000 MILLILITER(S): 9 INJECTION INTRAMUSCULAR; INTRAVENOUS; SUBCUTANEOUS at 20:07

## 2020-01-15 NOTE — ED PROVIDER NOTE - OBJECTIVE STATEMENT
19 year old female  with PMH of Anemia presenting to the ED c/o sharp right sided abdominal pain that started x2 days ago and states she has spotting that got heavy and stopped today with associated nausea. Pt states she is pregnant. Pt reports she had 3 miscarriages, 1 baby, and this would be his 5th pregnancy. States her previous miscarriages were always around 7 weeks. 19 year old female  @?5-8 weeks, hx of Anemia; now p/w abd pain and vaginal bleeding.  abd pain--sharp right sided abdominal pain that started x2 days ago and states she has vaginal spotting that got heavy and stopped today with associated nausea. states she took home pregnancy test and found out that she was pregnant last week.   reports she had 3 miscarriages, 1 baby, and this would be his 5th pregnancy. States her previous miscarriages were always around 7 weeks.  denies dysuria, hematuria,frequency, urgency.  PMH: anemia  SOCIAL: No tobacco/illicit substance use/EtOH

## 2020-01-15 NOTE — ED PROVIDER NOTE - PATIENT PORTAL LINK FT
You can access the FollowMyHealth Patient Portal offered by [facility] by registering at the following website: [email or web address] By joining [employer] FollowMyHealth portal, you will also be able to view your health information using other applications (apps) compatible with our system.

## 2020-01-15 NOTE — ED PROVIDER NOTE - PHYSICAL EXAMINATION
General:     NAD, well-nourished, well-appearing  Head:     NC/AT, EOMI, oral mucosa moist  Neck:     trachea midline  Lungs:     CTA b/l, no w/r/r  CVS:     S1S2, RRR, no m/g/r  Abd:     +BS, TTP @ RUQ, no rebound/guarding, no organomegaly  Ext:    2+ radial and pedal pulses, no c/c/e  Neuro: AAOx3, no sensory/motor deficits General:     NAD, well-nourished, well-appearing  Head:     NC/AT, EOMI, oral mucosa moist  Neck:     trachea midline  Lungs:     CTA b/l, no w/r/r  CVS:     S1S2, RRR, no m/g/r  Abd:     +BS, TTP @ RUQ, no rebound/guarding, no organomegaly  Ext:    2+ radial and pedal pulses, no c/c/e  Neuro: grossly intact

## 2020-01-15 NOTE — ED PROVIDER NOTE - NS ED ROS FT
Constitutional: (-) fever  (-)chills  (-)sweats  Eyes/ENT: (-) blurry vision, (-) epistaxis  (-)rhinorrhea   (-) sore throat    Cardiovascular: (-) chest pain, (-) palpitations (-) edema   Respiratory: (-) cough, (-) shortness of breath   Gastrointestinal: (+)nausea  (-)vomiting, (-) diarrhea  (+) abdominal pain   :  (-)dysuria, (-)frequency, (-)urgency, (-)hematuria  Musculoskeletal: (-) neck pain, (-) back pain, (-) joint pain  Integumentary: (-) rash, (-) edema  Neurological: (-) headache, (-) altered mental status  (-)LOC Constitutional: (-) fever  (-)chills  (-)sweats  Eyes/ENT: (-) blurry vision, (-) epistaxis  (-)rhinorrhea   (-) sore throat    Cardiovascular: (-) chest pain, (-) palpitations (-) edema   Respiratory: (-) cough, (-) shortness of breath   Gastrointestinal: (+)nausea  (-)vomiting, (-) diarrhea  (+) abdominal pain   :  (-)dysuria, (-)frequency, (-)urgency, (-)hematuria  +vaginal bleeding  Musculoskeletal: (-) neck pain, (-) back pain, (-) joint pain  Integumentary: (-) rash, (-) edema  Neurological: (-) headache, (-) altered mental status  (-)LOC

## 2020-01-15 NOTE — ED ADULT TRIAGE NOTE - CHIEF COMPLAINT QUOTE
'I am pregnant I took a home pregnancy test and it was positive and I am spotting, my LMP was either 1-2 months ago I don't know" PT states spotting was heavy and is lighter now. Pt  A &Ox. 4

## 2020-01-15 NOTE — ED PROVIDER NOTE - PROGRESS NOTE DETAILS
PT evaluated by intake physician. HPI/PE/ROS as noted above. Will follow up plan per intake physician. reviewed ultrasound results, lab work and urine, will tx patient for uti, copy of results printed for pt, informed of ultrasound gallbladder to follow up. pt explained d/c instructions

## 2020-01-16 PROBLEM — O03.9 COMPLETE OR UNSPECIFIED SPONTANEOUS ABORTION WITHOUT COMPLICATION: Chronic | Status: ACTIVE | Noted: 2019-06-21

## 2020-01-16 LAB
CULTURE RESULTS: SIGNIFICANT CHANGE UP
SPECIMEN SOURCE: SIGNIFICANT CHANGE UP

## 2020-04-09 ENCOUNTER — APPOINTMENT (OUTPATIENT)
Dept: ANTEPARTUM | Facility: CLINIC | Age: 20
End: 2020-04-09
Payer: MEDICAID

## 2020-04-09 ENCOUNTER — ASOB RESULT (OUTPATIENT)
Age: 20
End: 2020-04-09

## 2020-04-09 PROCEDURE — 76811 OB US DETAILED SNGL FETUS: CPT

## 2020-08-10 ENCOUNTER — APPOINTMENT (OUTPATIENT)
Dept: ANTEPARTUM | Facility: CLINIC | Age: 20
End: 2020-08-10

## 2020-08-12 ENCOUNTER — ASOB RESULT (OUTPATIENT)
Age: 20
End: 2020-08-12

## 2020-08-12 ENCOUNTER — APPOINTMENT (OUTPATIENT)
Dept: ANTEPARTUM | Facility: CLINIC | Age: 20
End: 2020-08-12
Payer: MEDICAID

## 2020-08-12 PROCEDURE — 76816 OB US FOLLOW-UP PER FETUS: CPT

## 2020-08-12 PROCEDURE — 76819 FETAL BIOPHYS PROFIL W/O NST: CPT

## 2020-10-14 NOTE — ED PROVIDER NOTE - NS ED MD DISPO SPECIAL CONSIDERATION1
None Consent (Marginal Mandibular)/Introductory Paragraph: The rationale for Mohs was explained to the patient and consent was obtained. The risks, benefits and alternatives to therapy were discussed in detail. Specifically, the risks of damage to the marginal mandibular branch of the facial nerve, infection, scarring, bleeding, prolonged wound healing, incomplete removal, allergy to anesthesia, and recurrence were addressed. Prior to the procedure, the treatment site was clearly identified and confirmed by the patient. All components of Universal Protocol/PAUSE Rule completed.

## 2021-07-26 ENCOUNTER — EMERGENCY (EMERGENCY)
Facility: HOSPITAL | Age: 21
LOS: 1 days | Discharge: HOME | End: 2021-07-26
Attending: EMERGENCY MEDICINE
Payer: MEDICAID

## 2021-07-26 VITALS
HEART RATE: 82 BPM | DIASTOLIC BLOOD PRESSURE: 73 MMHG | OXYGEN SATURATION: 96 % | HEIGHT: 62 IN | WEIGHT: 229.28 LBS | TEMPERATURE: 98 F | RESPIRATION RATE: 18 BRPM | SYSTOLIC BLOOD PRESSURE: 127 MMHG

## 2021-07-26 PROCEDURE — 99283 EMERGENCY DEPT VISIT LOW MDM: CPT

## 2021-07-26 RX ORDER — OLOPATADINE HYDROCHLORIDE 1 MG/ML
1 SOLUTION/ DROPS OPHTHALMIC
Qty: 10 | Refills: 0
Start: 2021-07-26 | End: 2021-08-04

## 2021-07-26 NOTE — ED PROVIDER NOTE - CLINICAL SUMMARY MEDICAL DECISION MAKING FREE TEXT BOX
20 year old male who presents c/o irritated red eyes, currently no symptoms in ER   will recommend OTC treatment, supportive care

## 2021-07-26 NOTE — ED PROVIDER NOTE - PATIENT PORTAL LINK FT
You can access the FollowMyHealth Patient Portal offered by [facility] by registering at the following website: [email or web address]. By joining [facility]’s FollowMyHealth portal, you will also be able to view your health information using other applications (apps) compatible with our system.

## 2021-07-26 NOTE — ED ADULT TRIAGE NOTE - RESPIRATORY RATE (BREATHS/MIN)
Daughter, Rosemary Montoya, had called our office yesterday wanting to discuss patient. I called and spoke with Rosemary Montoya. She wanted to let us know that she feels Guillermo Grimm is not being honest with us or her. She believes he is not feeling as bad as he is telling us.   He
16

## 2021-07-26 NOTE — ED PROVIDER NOTE - ATTENDING CONTRIBUTION TO CARE
seen with acp  pt with watery itchy eyes  child with same complaints also seen  mold in house and outside with children all weekend  pe as doc  agree with dx and tx

## 2021-07-26 NOTE — ED PROVIDER NOTE - OBJECTIVE STATEMENT
Patient is a 20 year old female who presents c/o dry itchy eyes overnight.  patient with mild nasal congestion. no cough, no fevers, no chills, no n/v  patient child with same symptoms  patient states house she is currently in has mold.

## 2021-12-13 ENCOUNTER — APPOINTMENT (OUTPATIENT)
Dept: ANTEPARTUM | Facility: CLINIC | Age: 21
End: 2021-12-13

## 2021-12-14 RX ORDER — OXYTOCIN 10 UNIT/ML
333.33 VIAL (ML) INJECTION
Qty: 20 | Refills: 0 | Status: DISCONTINUED | OUTPATIENT
Start: 2021-12-15 | End: 2021-12-17

## 2021-12-14 NOTE — OB PROVIDER H&P - NSHPPHYSICALEXAM_GEN_ALL_CORE
Vitals:    Gen:  Resp:  Abd:  VE:    Bedside sono:  FHT:  [place]: T(C): 37.1 (12-15-21 @ 03:14), Max: 37.1 (12-15-21 @ 03:14)  HR: 91 (12-15-21 @ 04:46) (72 - 104)  BP: 117/70 (12-15-21 @ 04:11) (91/55 - 137/60)  RR: 20 (12-15-21 @ 03:14) (20 - 20)  SpO2: 100% (12-15-21 @ 04:46) (84% - 100%)  Gen: NAD, well-appearing  Heart: S1 S2, RRR  Lungs: CTAB  Abd: soft, gravid  Ext: non-edematous, non-tender   SVE: 5/100/-2  FHT: 130bpm  [place]: q2-3min

## 2021-12-14 NOTE — OB PROVIDER H&P - NS_PARA_OBGYN_ALL_OB_NU
ANTICOAGULATION FOLLOW-UP CLINIC VISIT    Patient Name:  Carlton Bravo  Date:  10/29/2019  Contact Type:  Telephone/ Elroy Andrews homecare RN    SUBJECTIVE:    Patient had already taken his dose today, therefore will hold tomorrow's dose and take only 1 mg on Thursday  And Friday, 2 mg Saturday and Sunday. Did not give dosing for Monday in case of needing to hold.     OBJECTIVE    INR   Date Value Ref Range Status   10/29/2019 3.9  Final       ASSESSMENT / PLAN      Anticoagulation Summary  As of 10/29/2019    INR goal:   2.0-3.0   TTR:   60.6 % (3.8 y)   INR used for dosing:   3.9! (10/29/2019)   Warfarin maintenance plan:   1 mg (2 mg x 0.5) every Mon, Wed, Fri; 2 mg (2 mg x 1) all other days   Full warfarin instructions:   10/29: Hold; Otherwise 1 mg every Mon, Wed, Fri; 2 mg all other days   Weekly warfarin total:   11 mg   Plan last modified:   Wilda Metcalf RN (10/18/2019)   Next INR check:   11/4/2019   Priority:   INR   Target end date:       Indications    Atrial fibrillation (HCC) [I48.91] [I48.91]  Long term current use of anticoagulant therapy [Z79.01]             Anticoagulation Episode Summary     INR check location:       Preferred lab:       Send INR reminders to:   MIGUEL ANGEL DALEY    Comments:   Take in AM. possible chromogenic 10/antiphosholipid syndrome      Anticoagulation Care Providers     Provider Role Specialty Phone number    Lona Pettit, NI [facility] [phone]            See the Encounter Report to view Anticoagulation Flowsheet and Dosing Calendar (Go to Encounters tab in chart review, and find the Anticoagulation Therapy Visit)        Nisha Blanco, KACI                 
[facility] Call Center    Phone Message    May a detailed message be left on voicemail: yes    Reason for Call: INR: Caller REPORTING results:  3.9    Action Taken: Message routed to:  INR clinic/nurse p [phone]  
2

## 2021-12-14 NOTE — OB PROVIDER H&P - HISTORY OF PRESENT ILLNESS
Patient is a 21 year old  at 39w2d who presents to L&D for IOL.          RYAN: 21     LMP: 4/10/21          Pregnancy course:     BV in pregnancy     GBS pos     Chlamydia in pregnancy s/p tx     Late transfer of care           OBHx: NSVDx2 19, 20; SAB x4 (, , , );      GynHx: denies ovarian cysts, fibroids; h/o chlamydia s/p neg test of cure     PMH: denies      PSH: denies     Meds: PNVs     All: NKDA     SH: denies x3  Patient is a 21 year old  at 39w2d who presents to L&D with contractions since 8pm, occurring in increasing frequency and intensity. She endorses some leakage of discharge, but does not believe she broke her water. Denies vaginal bleeding, endorses good FM.     RYAN: 21   LMP: 4/10/21      Pregnancy course:   BV in pregnancy   GBS pos   Chlamydia in pregnancy s/p tx   Late transfer of care        OBHx: NSVDx2 19, 20; SAB x4 (, , , );    GynHx: denies ovarian cysts, fibroids; h/o chlamydia s/p neg test of cure   PMH: denies    PSH: denies   Meds: PNVs   All: NKDA   SH: denies x3

## 2021-12-14 NOTE — OB PROVIDER H&P - ASSESSMENT
Patient is a 21 year old  at 39w2d who presents to L&D for IOL.  Patient is a 21 year old  at 39w2d who is admitted to L&D for labor.    Admit to L&D  Consent  Admission labs, PNL  NPO, except ice chips  IV Fluids  GBS ppx due to GBS pos  Epidural for pain control    d/w Dr. Bal

## 2021-12-15 ENCOUNTER — TRANSCRIPTION ENCOUNTER (OUTPATIENT)
Age: 21
End: 2021-12-15

## 2021-12-15 ENCOUNTER — INPATIENT (INPATIENT)
Facility: HOSPITAL | Age: 21
LOS: 1 days | Discharge: HOME | End: 2021-12-17
Attending: OBSTETRICS & GYNECOLOGY | Admitting: OBSTETRICS & GYNECOLOGY
Payer: MEDICAID

## 2021-12-15 ENCOUNTER — RESULT REVIEW (OUTPATIENT)
Age: 21
End: 2021-12-15

## 2021-12-15 VITALS — HEART RATE: 85 BPM | SYSTOLIC BLOOD PRESSURE: 137 MMHG | DIASTOLIC BLOOD PRESSURE: 60 MMHG

## 2021-12-15 LAB
ANISOCYTOSIS BLD QL: SIGNIFICANT CHANGE UP
BASOPHILS # BLD AUTO: 0 K/UL — SIGNIFICANT CHANGE UP (ref 0–0.2)
BASOPHILS NFR BLD AUTO: 0 % — SIGNIFICANT CHANGE UP (ref 0–2)
BLD GP AB SCN SERPL QL: SIGNIFICANT CHANGE UP
CMV IGG FLD QL: 1.6 U/ML — HIGH
CMV IGG SERPL-IMP: POSITIVE
COVID-19 SPIKE DOMAIN AB INTERP: POSITIVE
COVID-19 SPIKE DOMAIN ANTIBODY RESULT: >250 U/ML — HIGH
ELLIPTOCYTES BLD QL SMEAR: SLIGHT — SIGNIFICANT CHANGE UP
EOSINOPHIL # BLD AUTO: 0.19 K/UL — SIGNIFICANT CHANGE UP (ref 0–0.5)
EOSINOPHIL NFR BLD AUTO: 1.8 % — SIGNIFICANT CHANGE UP (ref 0–6)
GIANT PLATELETS BLD QL SMEAR: PRESENT — SIGNIFICANT CHANGE UP
HBV SURFACE AB SER-ACNC: SIGNIFICANT CHANGE UP
HBV SURFACE AG SERPL QL IA: SIGNIFICANT CHANGE UP
HCT VFR BLD CALC: 26.2 % — LOW (ref 34.5–45)
HGB BLD-MCNC: 7.7 G/DL — LOW (ref 11.5–15.5)
HIV 1 & 2 AB SERPL IA.RAPID: SIGNIFICANT CHANGE UP
HYPOCHROMIA BLD QL: SIGNIFICANT CHANGE UP
LYMPHOCYTES # BLD AUTO: 2.67 K/UL — SIGNIFICANT CHANGE UP (ref 1–3.3)
LYMPHOCYTES # BLD AUTO: 25.4 % — SIGNIFICANT CHANGE UP (ref 13–44)
MACROCYTES BLD QL: SLIGHT — SIGNIFICANT CHANGE UP
MANUAL SMEAR VERIFICATION: SIGNIFICANT CHANGE UP
MCHC RBC-ENTMCNC: 19.9 PG — LOW (ref 27–34)
MCHC RBC-ENTMCNC: 29.4 GM/DL — LOW (ref 32–36)
MCV RBC AUTO: 67.7 FL — LOW (ref 80–100)
MEV IGG SER-ACNC: 42.7 AU/ML — SIGNIFICANT CHANGE UP
MEV IGG+IGM SER-IMP: POSITIVE — SIGNIFICANT CHANGE UP
MICROCYTES BLD QL: SIGNIFICANT CHANGE UP
MONOCYTES # BLD AUTO: 0.74 K/UL — SIGNIFICANT CHANGE UP (ref 0–0.9)
MONOCYTES NFR BLD AUTO: 7 % — SIGNIFICANT CHANGE UP (ref 2–14)
NEUTROPHILS # BLD AUTO: 6.92 K/UL — SIGNIFICANT CHANGE UP (ref 1.8–7.4)
NEUTROPHILS NFR BLD AUTO: 65.8 % — SIGNIFICANT CHANGE UP (ref 43–77)
NRBC # BLD: 1 /100 — HIGH (ref 0–0)
OVALOCYTES BLD QL SMEAR: SLIGHT — SIGNIFICANT CHANGE UP
PLAT MORPH BLD: ABNORMAL
PLATELET # BLD AUTO: 406 K/UL — HIGH (ref 150–400)
POIKILOCYTOSIS BLD QL AUTO: SLIGHT — SIGNIFICANT CHANGE UP
POLYCHROMASIA BLD QL SMEAR: SIGNIFICANT CHANGE UP
RBC # BLD: 3.87 M/UL — SIGNIFICANT CHANGE UP (ref 3.8–5.2)
RBC # FLD: 20 % — HIGH (ref 10.3–14.5)
RBC BLD AUTO: ABNORMAL
RUBV IGG SER-ACNC: 3.4 INDEX — SIGNIFICANT CHANGE UP
RUBV IGG SER-IMP: POSITIVE — SIGNIFICANT CHANGE UP
SARS-COV-2 IGG+IGM SERPL QL IA: >250 U/ML — HIGH
SARS-COV-2 IGG+IGM SERPL QL IA: POSITIVE
SARS-COV-2 RNA SPEC QL NAA+PROBE: SIGNIFICANT CHANGE UP
STOMATOCYTES BLD QL SMEAR: SLIGHT — SIGNIFICANT CHANGE UP
T GONDII IGG SER QL: <3 IU/ML — SIGNIFICANT CHANGE UP
T GONDII IGG SER QL: NEGATIVE — SIGNIFICANT CHANGE UP
T PALLIDUM AB TITR SER: NEGATIVE — SIGNIFICANT CHANGE UP
WBC # BLD: 10.52 K/UL — HIGH (ref 3.8–10.5)
WBC # FLD AUTO: 10.52 K/UL — HIGH (ref 3.8–10.5)

## 2021-12-15 PROCEDURE — 88307 TISSUE EXAM BY PATHOLOGIST: CPT | Mod: 26

## 2021-12-15 RX ORDER — KETOROLAC TROMETHAMINE 30 MG/ML
30 SYRINGE (ML) INJECTION ONCE
Refills: 0 | Status: DISCONTINUED | OUTPATIENT
Start: 2021-12-15 | End: 2021-12-15

## 2021-12-15 RX ORDER — AMPICILLIN TRIHYDRATE 250 MG
1 CAPSULE ORAL EVERY 4 HOURS
Refills: 0 | Status: DISCONTINUED | OUTPATIENT
Start: 2021-12-15 | End: 2021-12-15

## 2021-12-15 RX ORDER — SODIUM CHLORIDE 9 MG/ML
3 INJECTION INTRAMUSCULAR; INTRAVENOUS; SUBCUTANEOUS EVERY 8 HOURS
Refills: 0 | Status: DISCONTINUED | OUTPATIENT
Start: 2021-12-15 | End: 2021-12-17

## 2021-12-15 RX ORDER — FERROUS SULFATE 325(65) MG
325 TABLET ORAL DAILY
Refills: 0 | Status: DISCONTINUED | OUTPATIENT
Start: 2021-12-15 | End: 2021-12-17

## 2021-12-15 RX ORDER — INFLUENZA VIRUS VACCINE 15; 15; 15; 15 UG/.5ML; UG/.5ML; UG/.5ML; UG/.5ML
0.5 SUSPENSION INTRAMUSCULAR ONCE
Refills: 0 | Status: DISCONTINUED | OUTPATIENT
Start: 2021-12-15 | End: 2021-12-17

## 2021-12-15 RX ORDER — ACETAMINOPHEN 500 MG
975 TABLET ORAL
Refills: 0 | Status: DISCONTINUED | OUTPATIENT
Start: 2021-12-15 | End: 2021-12-17

## 2021-12-15 RX ORDER — SIMETHICONE 80 MG/1
80 TABLET, CHEWABLE ORAL EVERY 4 HOURS
Refills: 0 | Status: DISCONTINUED | OUTPATIENT
Start: 2021-12-15 | End: 2021-12-17

## 2021-12-15 RX ORDER — LANOLIN
1 OINTMENT (GRAM) TOPICAL EVERY 6 HOURS
Refills: 0 | Status: DISCONTINUED | OUTPATIENT
Start: 2021-12-15 | End: 2021-12-17

## 2021-12-15 RX ORDER — OXYCODONE HYDROCHLORIDE 5 MG/1
5 TABLET ORAL
Refills: 0 | Status: DISCONTINUED | OUTPATIENT
Start: 2021-12-15 | End: 2021-12-17

## 2021-12-15 RX ORDER — CITRIC ACID/SODIUM CITRATE 300-500 MG
30 SOLUTION, ORAL ORAL ONCE
Refills: 0 | Status: DISCONTINUED | OUTPATIENT
Start: 2021-12-15 | End: 2021-12-15

## 2021-12-15 RX ORDER — MAGNESIUM HYDROXIDE 400 MG/1
30 TABLET, CHEWABLE ORAL
Refills: 0 | Status: DISCONTINUED | OUTPATIENT
Start: 2021-12-15 | End: 2021-12-17

## 2021-12-15 RX ORDER — AMPICILLIN TRIHYDRATE 250 MG
2 CAPSULE ORAL ONCE
Refills: 0 | Status: COMPLETED | OUTPATIENT
Start: 2021-12-15 | End: 2021-12-15

## 2021-12-15 RX ORDER — TETANUS TOXOID, REDUCED DIPHTHERIA TOXOID AND ACELLULAR PERTUSSIS VACCINE, ADSORBED 5; 2.5; 8; 8; 2.5 [IU]/.5ML; [IU]/.5ML; UG/.5ML; UG/.5ML; UG/.5ML
0.5 SUSPENSION INTRAMUSCULAR ONCE
Refills: 0 | Status: DISCONTINUED | OUTPATIENT
Start: 2021-12-15 | End: 2021-12-17

## 2021-12-15 RX ORDER — AER TRAVELER 0.5 G/1
1 SOLUTION RECTAL; TOPICAL EVERY 4 HOURS
Refills: 0 | Status: DISCONTINUED | OUTPATIENT
Start: 2021-12-15 | End: 2021-12-17

## 2021-12-15 RX ORDER — PRAMOXINE HYDROCHLORIDE 150 MG/15G
1 AEROSOL, FOAM RECTAL EVERY 4 HOURS
Refills: 0 | Status: DISCONTINUED | OUTPATIENT
Start: 2021-12-15 | End: 2021-12-17

## 2021-12-15 RX ORDER — BENZOCAINE 10 %
1 GEL (GRAM) MUCOUS MEMBRANE EVERY 6 HOURS
Refills: 0 | Status: DISCONTINUED | OUTPATIENT
Start: 2021-12-15 | End: 2021-12-17

## 2021-12-15 RX ORDER — IBUPROFEN 200 MG
600 TABLET ORAL EVERY 6 HOURS
Refills: 0 | Status: DISCONTINUED | OUTPATIENT
Start: 2021-12-15 | End: 2021-12-17

## 2021-12-15 RX ORDER — OXYTOCIN 10 UNIT/ML
333.33 VIAL (ML) INJECTION
Qty: 20 | Refills: 0 | Status: DISCONTINUED | OUTPATIENT
Start: 2021-12-15 | End: 2021-12-17

## 2021-12-15 RX ORDER — DIBUCAINE 1 %
1 OINTMENT (GRAM) RECTAL EVERY 6 HOURS
Refills: 0 | Status: DISCONTINUED | OUTPATIENT
Start: 2021-12-15 | End: 2021-12-17

## 2021-12-15 RX ORDER — DIPHENHYDRAMINE HCL 50 MG
25 CAPSULE ORAL EVERY 6 HOURS
Refills: 0 | Status: DISCONTINUED | OUTPATIENT
Start: 2021-12-15 | End: 2021-12-17

## 2021-12-15 RX ORDER — IBUPROFEN 200 MG
600 TABLET ORAL EVERY 6 HOURS
Refills: 0 | Status: COMPLETED | OUTPATIENT
Start: 2021-12-15 | End: 2022-11-13

## 2021-12-15 RX ORDER — SODIUM CHLORIDE 9 MG/ML
1000 INJECTION, SOLUTION INTRAVENOUS
Refills: 0 | Status: DISCONTINUED | OUTPATIENT
Start: 2021-12-15 | End: 2021-12-15

## 2021-12-15 RX ORDER — OXYCODONE HYDROCHLORIDE 5 MG/1
5 TABLET ORAL ONCE
Refills: 0 | Status: DISCONTINUED | OUTPATIENT
Start: 2021-12-15 | End: 2021-12-17

## 2021-12-15 RX ORDER — HYDROCORTISONE 1 %
1 OINTMENT (GRAM) TOPICAL EVERY 6 HOURS
Refills: 0 | Status: DISCONTINUED | OUTPATIENT
Start: 2021-12-15 | End: 2021-12-17

## 2021-12-15 RX ADMIN — Medication 30 MILLIGRAM(S): at 08:03

## 2021-12-15 RX ADMIN — Medication 1000 MILLIUNIT(S)/MIN: at 07:02

## 2021-12-15 RX ADMIN — Medication 30 MILLIGRAM(S): at 07:47

## 2021-12-15 RX ADMIN — SODIUM CHLORIDE 3 MILLILITER(S): 9 INJECTION INTRAMUSCULAR; INTRAVENOUS; SUBCUTANEOUS at 22:21

## 2021-12-15 RX ADMIN — Medication 600 MILLIGRAM(S): at 11:56

## 2021-12-15 RX ADMIN — Medication 216 GRAM(S): at 03:30

## 2021-12-15 RX ADMIN — Medication 600 MILLIGRAM(S): at 23:18

## 2021-12-15 RX ADMIN — Medication 975 MILLIGRAM(S): at 21:21

## 2021-12-15 RX ADMIN — Medication 325 MILLIGRAM(S): at 11:56

## 2021-12-15 RX ADMIN — Medication 1 TABLET(S): at 11:56

## 2021-12-15 RX ADMIN — Medication 975 MILLIGRAM(S): at 09:41

## 2021-12-15 RX ADMIN — Medication 600 MILLIGRAM(S): at 18:16

## 2021-12-15 RX ADMIN — SODIUM CHLORIDE 125 MILLILITER(S): 9 INJECTION, SOLUTION INTRAVENOUS at 04:38

## 2021-12-15 NOTE — OB NEONATOLOGY/PEDIATRICIAN DELIVERY SUMMARY - BABY A: APGAR 5 MIN COLOR, DELIVERY
(1) body pink, extremities blue
Modify Regimen: Doxycycline 50mg daily
Continue Regimen: Clindamycin swabs twice daily \\nTretinoin 0.5% pea size amount to face at bedtime
Plan: - picture taken at today’s visit \\n- pt is using clindamycin swabs twice daily and Tretinoin at bedtime \\n- patient states she is not taking the doxycycline \\n- patient is moisturizing daily \\n- disc low dose doxycycline for maintenance \\n- will prescribe doxycycline 50mg daily At today’s visit \\n- patient will follow up in 3 months
Detail Level: Generalized
Plan: -recommended wart therapy OTC \\n-will recheck in 3 months

## 2021-12-15 NOTE — OB RN PATIENT PROFILE - FALL HARM RISK - UNIVERSAL INTERVENTIONS
Bed in lowest position, wheels locked, appropriate side rails in place/Call bell, personal items and telephone in reach/Instruct patient to call for assistance before getting out of bed or chair/Non-slip footwear when patient is out of bed/[place] to call system/Physically safe environment - no spills, clutter or unnecessary equipment/Purposeful Proactive Rounding/Room/bathroom lighting operational, light cord in reach

## 2021-12-15 NOTE — OB PROVIDER DELIVERY SUMMARY - NSSELHIDDEN_OBGYN_ALL_OB_FT
[NS_DeliveryAttending1_OBGYN_ALL_OB_FT:[id number]],[NS_DeliveryAssist1_OBGYN_ALL_OB_FT:[id number]]

## 2021-12-15 NOTE — OB RN DELIVERY SUMMARY - NSSELHIDDEN_OBGYN_ALL_OB_FT
[NS_DeliveryAttending1_OBGYN_ALL_OB_FT:[id number]],[NS_DeliveryAssist1_OBGYN_ALL_OB_FT:[id number]],[NS_DeliveryAssist2_OBGYN_ALL_OB_FT:[id number]],[NS_DeliveryRN_OBGYN_ALL_OB_FT:[id number]=]

## 2021-12-15 NOTE — OB NEONATOLOGY/PEDIATRICIAN DELIVERY SUMMARY - NSPEDSNEONOTESA_OBGYN_ALL_OB_FT
Requested by Dr. Bal to evalute this    delivery  vaginally due to precipitous delivery  of a 22 y/o G5  P ???2 mom at 39.2  weeks GA.  She had limited  PNC,  all prenatal labs are pending but mother refer that she is GBS positive. Maternal history  unremarkable (limited PNC)  , CODID-19 negative .  L&D: ROM at time of delivery clear fluids.  Baby born precipitous vertex  with one cord around the neck , cry good  tone  but pale blue transferred to warmer, orally suctioned, dried,  stimulated; blow O2 given . Upon my arrival infant with good tone and  color Oxygen saturation on target range.    Apgar score 8/9. BW: 2875 g.  A: 39.2 week AGA   maternal COVID-19 negative, father negative.  P: Baby for transition and when stable to NBN.      Follow maternal laboratories ( not available at time of delivery)

## 2021-12-15 NOTE — OB RN DELIVERY SUMMARY - NS_SEPSISRSKCALC_OBGYN_ALL_OB_FT
EOS calculated successfully. EOS Risk Factor: 0.5/1000 live births ([facility] national incidence); GA=39w2d; Temp=98.8; ROM=0.033; GBS='Positive'; Antibiotics='Broad spectrum antibiotics 2-3.9 hrs prior to birth'

## 2021-12-15 NOTE — OB PROVIDER DELIVERY SUMMARY - NSPROVIDERDELIVERYNOTE_OBGYN_ALL_OB_FT
Admitted for labor. Progressed to deliver  live baby girl at 0606 Apgar 9/9. Placenta spontaneous complete at 0609. Pitocin started. 1st degree midline perineal laceration. Pt declined repair of laceration even when offered local anesthesia.  Blood loss 52cc.

## 2021-12-16 LAB
C TRACH RRNA SPEC QL NAA+PROBE: SIGNIFICANT CHANGE UP
GROUP B BETA STREP DNA (PCR): DETECTED
GROUP B BETA STREP INTERPRETATION: SIGNIFICANT CHANGE UP
HCT VFR BLD CALC: 23 % — LOW (ref 34.5–45)
HCT VFR BLD CALC: 26.6 % — LOW (ref 34.5–45)
HGB BLD-MCNC: 6.7 G/DL — CRITICAL LOW (ref 11.5–15.5)
HGB BLD-MCNC: 7.9 G/DL — LOW (ref 11.5–15.5)
MCHC RBC-ENTMCNC: 20.7 PG — LOW (ref 27–34)
MCHC RBC-ENTMCNC: 29.7 GM/DL — LOW (ref 32–36)
MCV RBC AUTO: 69.6 FL — LOW (ref 80–100)
N GONORRHOEA RRNA SPEC QL NAA+PROBE: SIGNIFICANT CHANGE UP
PLATELET # BLD AUTO: 395 K/UL — SIGNIFICANT CHANGE UP (ref 150–400)
RBC # BLD: 3.82 M/UL — SIGNIFICANT CHANGE UP (ref 3.8–5.2)
RBC # FLD: 21.3 % — HIGH (ref 10.3–14.5)
SOURCE GROUP B STREP: SIGNIFICANT CHANGE UP
SPECIMEN SOURCE: SIGNIFICANT CHANGE UP
WBC # BLD: 11.53 K/UL — HIGH (ref 3.8–10.5)
WBC # FLD AUTO: 11.53 K/UL — HIGH (ref 3.8–10.5)

## 2021-12-16 RX ORDER — IBUPROFEN 200 MG
1 TABLET ORAL
Qty: 56 | Refills: 0
Start: 2021-12-16 | End: 2021-12-29

## 2021-12-16 RX ORDER — ACETAMINOPHEN 500 MG
2 TABLET ORAL
Qty: 56 | Refills: 0
Start: 2021-12-16 | End: 2021-12-22

## 2021-12-16 RX ADMIN — Medication 325 MILLIGRAM(S): at 13:57

## 2021-12-16 RX ADMIN — Medication 600 MILLIGRAM(S): at 05:46

## 2021-12-16 RX ADMIN — SODIUM CHLORIDE 3 MILLILITER(S): 9 INJECTION INTRAMUSCULAR; INTRAVENOUS; SUBCUTANEOUS at 19:59

## 2021-12-16 RX ADMIN — SODIUM CHLORIDE 3 MILLILITER(S): 9 INJECTION INTRAMUSCULAR; INTRAVENOUS; SUBCUTANEOUS at 05:19

## 2021-12-16 RX ADMIN — Medication 1 TABLET(S): at 13:58

## 2021-12-16 RX ADMIN — Medication 600 MILLIGRAM(S): at 13:58

## 2021-12-16 NOTE — DISCHARGE NOTE OB - CARE PROVIDER_API CALL
Alec Reyna)  Obstetrics and Gynecology  [address] [place] [address]  Phone: [phone]  Fax: [phone]  Follow Up Time:

## 2021-12-16 NOTE — DISCHARGE NOTE OB - PATIENT PORTAL LINK FT
You can access the FollowMyHealth Patient Portal offered by [facility] by registering at the following website: [email or web address]. By joining [employer]’s FollowMyHealth portal, you will also be able to view your health information using other applications (apps) compatible with our system.

## 2021-12-16 NOTE — CHART NOTE - NSCHARTNOTEFT_GEN_A_CORE
Sulema seen at bedside to obtain consent for blood transfusion. Patient denies lightheadedness, dizziness, CP, or SOB.                           6.7    x     )-----------( x        ( 16 Dec 2021 07:50 )             23.0       Discussed with patient recommendation for blood transfusion at this time. Risks and benefits of blood transfusion provided including:    Risks:  • Uncommon (1-5%) chance: Mild reactions resulting in itching, rash, fever, headaches.  • Rare (<1% chance): Respiratory distress (shortness of breath) or lung injury, Exposure to blood borne micro-organisms (bacteria and parasites) that could result in an infection, Possible effects on the immune system which may decrease the body’s ability to fight infection, Exposure to blood borne viruses such as hepatitis B, Shock  • Extremely rare (< 1/1,000,000): Exposure to blood borne viruses such as hepatitis C  and HIV, Death    Benefits:  •	Improved blood supply to the major organs of the body including:, brain, heart, kidney and lungs.    Possible alternatives discussed including no transfusion, intravenous fluids, and use of blood formation agents such as erythropoietin and iron.     After providing the above, patient has consented to a blood  transfusion. Form signed, witnessed by nurse and placed in chart.

## 2021-12-16 NOTE — DISCHARGE NOTE OB - NS MD DC FALL RISK RISK
For information on Fall & Injury Prevention, visit: [email or web address] OR  [email or web address] OR  https://www.cdc.gov/steadi/patient.html

## 2021-12-16 NOTE — DISCHARGE NOTE OB - MATERIALS PROVIDED
Vaccinations/[facility]  Screening Program/  Immunization Record/Guide to Postpartum Care/[facility] Hearing Screen Program/Back To Sleep Handout/Shaken Baby Prevention Handout/Discharge Medication Information for Patients and Families Pocket Guide

## 2021-12-16 NOTE — DISCHARGE NOTE OB - CARE PLAN
Principal Discharge DX:	Normal spontaneous vaginal delivery  Assessment and plan of treatment:	1) Please take ibuprofen and tylenol as needed for pain.  2) Nothing in the vagina for 6 weeks (including no sex, no tampons, and no douching).  3) No tub baths or pools for 2 weeks. Showers are okay.  4) Please call your doctor for a follow up appointment  5) Please call the office sooner if you have heavy vaginal bleeding, severe abdominal pain, or fever over 100.4F.   1 Principal Discharge DX:	Normal spontaneous vaginal delivery  Assessment and plan of treatment:	1) Please take ibuprofen and tylenol as needed for pain.  2) Nothing in the vagina for 6 weeks (including no sex, no tampons, and no douching).  3) No tub baths or pools for 2 weeks. Showers are okay.  4) Please call your doctor for a follow up appointment  5) Please call the office sooner if you have heavy vaginal bleeding, severe abdominal pain, or fever over 100.4F.  Secondary Diagnosis:	Acute on chronic blood loss anemia  Assessment and plan of treatment:	patient with anemia during pregnancy, acute blood loss anemia after delivery, estimated blood loss WNL (52cc), given 1u pRBC in post partum period with adequate response. At time of discharge she had no symptoms fo anemia and vital were WNL.   will f/u in clinic for continued surveillance and monitoring

## 2021-12-16 NOTE — DISCHARGE NOTE OB - PLAN OF CARE
1) Please take ibuprofen and tylenol as needed for pain.  2) Nothing in the vagina for 6 weeks (including no sex, no tampons, and no douching).  3) No tub baths or pools for 2 weeks. Showers are okay.  4) Please call your doctor for a follow up appointment  5) Please call the office sooner if you have heavy vaginal bleeding, severe abdominal pain, or fever over 100.4F. patient with anemia during pregnancy, acute blood loss anemia after delivery, estimated blood loss WNL (52cc), given 1u pRBC in post partum period with adequate response. At time of discharge she had no symptoms fo anemia and vital were WNL.   will f/u in clinic for continued surveillance and monitoring

## 2021-12-16 NOTE — DISCHARGE NOTE OB - HOSPITAL COURSE
s/p uncomplicated  on 12/15/21. Patient transferred to post partum unit, uncomplicated hospital course. At the time of discharge patient was tolerating regular diet PO, ambulating, voiding, and demonstrating bowel function. Pain well controlled with pain medications PRN.

## 2021-12-17 VITALS
OXYGEN SATURATION: 97 % | SYSTOLIC BLOOD PRESSURE: 97 MMHG | HEART RATE: 73 BPM | TEMPERATURE: 98 F | RESPIRATION RATE: 18 BRPM | DIASTOLIC BLOOD PRESSURE: 66 MMHG

## 2021-12-17 PROCEDURE — 86706 HEP B SURFACE ANTIBODY: CPT

## 2021-12-17 PROCEDURE — 86765 RUBEOLA ANTIBODY: CPT

## 2021-12-17 PROCEDURE — 86850 RBC ANTIBODY SCREEN: CPT

## 2021-12-17 PROCEDURE — 85027 COMPLETE CBC AUTOMATED: CPT

## 2021-12-17 PROCEDURE — 86703 HIV-1/HIV-2 1 RESULT ANTBDY: CPT

## 2021-12-17 PROCEDURE — 85018 HEMOGLOBIN: CPT

## 2021-12-17 PROCEDURE — 87340 HEPATITIS B SURFACE AG IA: CPT

## 2021-12-17 PROCEDURE — 36415 COLL VENOUS BLD VENIPUNCTURE: CPT

## 2021-12-17 PROCEDURE — 59050 FETAL MONITOR W/REPORT: CPT

## 2021-12-17 PROCEDURE — 87653 STREP B DNA AMP PROBE: CPT

## 2021-12-17 PROCEDURE — 85025 COMPLETE CBC W/AUTO DIFF WBC: CPT

## 2021-12-17 PROCEDURE — 86900 BLOOD TYPING SEROLOGIC ABO: CPT

## 2021-12-17 PROCEDURE — 59025 FETAL NON-STRESS TEST: CPT

## 2021-12-17 PROCEDURE — 87635 SARS-COV-2 COVID-19 AMP PRB: CPT

## 2021-12-17 PROCEDURE — 86777 TOXOPLASMA ANTIBODY: CPT

## 2021-12-17 PROCEDURE — 86923 COMPATIBILITY TEST ELECTRIC: CPT

## 2021-12-17 PROCEDURE — P9016: CPT

## 2021-12-17 PROCEDURE — 86780 TREPONEMA PALLIDUM: CPT

## 2021-12-17 PROCEDURE — 87591 N.GONORRHOEAE DNA AMP PROB: CPT

## 2021-12-17 PROCEDURE — G0463: CPT

## 2021-12-17 PROCEDURE — 86762 RUBELLA ANTIBODY: CPT

## 2021-12-17 PROCEDURE — 86769 SARS-COV-2 COVID-19 ANTIBODY: CPT

## 2021-12-17 PROCEDURE — 86644 CMV ANTIBODY: CPT

## 2021-12-17 PROCEDURE — 36430 TRANSFUSION BLD/BLD COMPNT: CPT

## 2021-12-17 PROCEDURE — 86901 BLOOD TYPING SEROLOGIC RH(D): CPT

## 2021-12-17 PROCEDURE — 88307 TISSUE EXAM BY PATHOLOGIST: CPT

## 2021-12-17 PROCEDURE — 87491 CHLMYD TRACH DNA AMP PROBE: CPT

## 2021-12-17 PROCEDURE — 85014 HEMATOCRIT: CPT

## 2021-12-17 RX ADMIN — Medication 600 MILLIGRAM(S): at 05:08

## 2021-12-17 RX ADMIN — SODIUM CHLORIDE 3 MILLILITER(S): 9 INJECTION INTRAMUSCULAR; INTRAVENOUS; SUBCUTANEOUS at 05:08

## 2021-12-17 RX ADMIN — Medication 975 MILLIGRAM(S): at 09:45

## 2021-12-17 NOTE — PROGRESS NOTE ADULT - ASSESSMENT
AKILA BOWEN is a 21y  now PPD#1 s/p spontaneous vaginal delivery at 39weeks gestation, uncomplicated.        A/P:    -Vital signs stable  -Hgb: 7.7 -> AM labs pending   -Voiding, tolerating PO, bowel function nml   -Advance care as tolerated   -Continue routine postpartum care and education  -Healthy female infant  - DVT ppx: Ambulation encouraged. SCDs while in bed.   -Dispo: Anticipate discharge to home today  pending attending approval.
Patient is s/p  day# 2  Patient is feeling well and reports no issues.   status post 1U pRBC, doing well. denies symptoms of anemia  Continue the current management with current pain medication regimen.   Encourage ambulation and a regular diet.   Discharge home according to the normal criteria.

## 2021-12-17 NOTE — PROGRESS NOTE ADULT - SUBJECTIVE AND OBJECTIVE BOX
AKILA BOWEN is a 21y  now PPD#1 s/p spontaneous vaginal delivery at 39weeks gestation, uncomplicated.    S:    No acute events overnight.   The patient has no complaints.  Pain controlled with current treatment regimen.   She is ambulating without difficulty and tolerating PO.   + flatus/-BM/+ voiding   She endorses appropriate lochia, which is decreasing.   She is dual feeding.   She denies fevers, chills, nausea and vomiting.   She denies lightheadedness, dizziness, palpitations, chest pain and SOB.     O:    T(C): 36.7 (21 @ 05:32), Max: 37 (12-15-21 @ 10:35)  HR: 78 (21 @ 05:32) (62 - 98)  BP: 98/58 (21 @ 05:32) (98/53 - 107/58)  RR: 18 (21 @ 05:32) (17 - 18)  SpO2: 99% (21 @ 05:32) (92% - 100%)    Gen: NAD, AOx3  CV: RRR, S1/S2 present  Pulm: CTAB  Abdomen:  Soft, non-tender, non-distended, +bowel sounds  Uterus:  Fundus firm below umbilicus  VE:  Expected lochia  Ext:  Bilateral lower extremities non-tender and non-edematous                          7.7    10.52 )-----------( 406      ( 15 Dec 2021 05:31 )             26.2         
AKILA [id number]  Postpartum Note Vaginal Delivery  Patient is a 22yo  s/p FT  day 2.    Subjective:  No acute events overnight.  Patient stayed overnight for housing issue  Patient is tolerating diet and denies N/V.   Patient still has slight vaginal bleeding that is decreasing in amount.   She is breastfeeding and the baby is latching on.    Urinating appropriately.   +flatus.    Physical exam:  Vital Signs Last 24 Hrs  T(C): 36.9 (17 Dec 2021 04:00), Max: 37.1 (16 Dec 2021 15:05)  T(F): 98.5 (17 Dec 2021 04:00), Max: 98.8 (16 Dec 2021 15:05)  HR: 73 (17 Dec 2021 04:00) (72 - 90)  BP: 97/66 (17 Dec 2021 04:00) (95/61 - 101/66)  RR: 18 (17 Dec 2021 04:00) (18 - 18)  SpO2: 97% (17 Dec 2021 04:00) (97% - 100%)    Heart: RRR  Lungs: CTABL  Breast: non tender, not engorged   Abdomen: Soft, nontender, no distension, firm uterine fundus below umbilicus  Ext: No DVT signs, warm extremities    LABS:                        7.9    11.53 )-----------( 395      ( 16 Dec 2021 19:11 )             26.6       acetaminophen     Tablet .. 975 milliGRAM(s) Oral <User Schedule>  benzocaine 20%/menthol 0.5% Spray 1 Spray(s) Topical every 6 hours PRN  dibucaine 1% Ointment 1 Application(s) Topical every 6 hours PRN  diphenhydrAMINE 25 milliGRAM(s) Oral every 6 hours PRN  diphtheria/tetanus/pertussis (acellular) Vaccine (ADAcel) 0.5 milliLiter(s) IntraMuscular once  ferrous    sulfate 325 milliGRAM(s) Oral daily  hydrocortisone 1% Cream 1 Application(s) Topical every 6 hours PRN  ibuprofen  Tablet. 600 milliGRAM(s) Oral every 6 hours  influenza   Vaccine 0.5 milliLiter(s) IntraMuscular once  lanolin Ointment 1 Application(s) Topical every 6 hours PRN  magnesium hydroxide Suspension 30 milliLiter(s) Oral two times a day PRN  oxyCODONE    IR 5 milliGRAM(s) Oral every 3 hours PRN  oxyCODONE    IR 5 milliGRAM(s) Oral once PRN  oxytocin Infusion 333.333 milliUNIT(s)/Min IV Continuous <Continuous>  oxytocin Infusion 333.333 milliUNIT(s)/Min IV Continuous <Continuous>  pramoxine 1%/zinc 5% Cream 1 Application(s) Topical every 4 hours PRN  prenatal multivitamin 1 Tablet(s) Oral daily  simethicone 80 milliGRAM(s) Chew every 4 hours PRN  sodium chloride 0.9% lock flush 3 milliLiter(s) IV Push every 8 hours  witch hazel Pads 1 Application(s) Topical every 4 hours PRN

## 2021-12-29 LAB — SURGICAL PATHOLOGY STUDY: SIGNIFICANT CHANGE UP

## 2022-12-01 NOTE — ED PROVIDER NOTE - CONDITION AT DISCHARGE:
Referral from Dr Jara  Planned breast reduction for patient with benign appearing low right axillary node  Palpable on exam at the lower edge of axilla at the anterior axillary line  Will have no problem doing an excisional biopsy of this area through a standard reduction incision  Will be available to assist Dr Jara during the schedule procedure     Satisfactory

## 2023-03-10 ENCOUNTER — OFFICE (OUTPATIENT)
Dept: URBAN - METROPOLITAN AREA CLINIC 112 | Facility: CLINIC | Age: 23
Setting detail: OPHTHALMOLOGY
End: 2023-03-10
Payer: COMMERCIAL

## 2023-03-10 ENCOUNTER — RX ONLY (RX ONLY)
Age: 23
End: 2023-03-10

## 2023-03-10 DIAGNOSIS — H01.004: ICD-10-CM

## 2023-03-10 DIAGNOSIS — H01.001: ICD-10-CM

## 2023-03-10 DIAGNOSIS — H01.002: ICD-10-CM

## 2023-03-10 DIAGNOSIS — H01.005: ICD-10-CM

## 2023-03-10 PROBLEM — H10.9 CONJUNCTIVITIS, UNSPECIFIED: Status: ACTIVE | Noted: 2023-03-10

## 2023-03-10 PROCEDURE — 99203 OFFICE O/P NEW LOW 30 MIN: CPT | Performed by: REGISTERED NURSE

## 2023-03-10 ASSESSMENT — REFRACTION_AUTOREFRACTION
OD_AXIS: 021
OD_CYLINDER: -2.00
OS_SPHERE: -3.75
OS_CYLINDER: -1.75
OS_AXIS: 163
OD_SPHERE: -1.50

## 2023-03-10 ASSESSMENT — KERATOMETRY
OD_AXISANGLE_DEGREES: 116
OS_AXISANGLE_DEGREES: 074
OS_K2POWER_DIOPTERS: 45.75
OD_K2POWER_DIOPTERS: 45.50
OD_K1POWER_DIOPTERS: 43.25
OS_K1POWER_DIOPTERS: 43.50

## 2023-03-10 ASSESSMENT — CONFRONTATIONAL VISUAL FIELD TEST (CVF)
OD_FINDINGS: FULL
OS_FINDINGS: FULL

## 2023-03-10 ASSESSMENT — AXIALLENGTH_DERIVED
OD_AL: 24.2631
OS_AL: 25.0675

## 2023-03-10 ASSESSMENT — VISUAL ACUITY
OD_BCVA: 20/60
OS_BCVA: 20/40+1

## 2023-03-10 ASSESSMENT — LID EXAM ASSESSMENTS
OS_BLEPHARITIS: LLL LUL 3+
OD_BLEPHARITIS: RLL RUL 3+

## 2023-03-10 ASSESSMENT — SPHEQUIV_DERIVED
OD_SPHEQUIV: -2.5
OS_SPHEQUIV: -4.625

## 2023-03-10 ASSESSMENT — TONOMETRY
OS_IOP_MMHG: 18
OD_IOP_MMHG: 18

## 2023-08-28 NOTE — OB RN PATIENT PROFILE - SOURCE OF INFORMATION, OB PROFILE
patient

## 2024-03-12 NOTE — ED ADULT TRIAGE NOTE - AS HEIGHT TYPE
Left message for patient to schedule blood pressure follow up with PCP or RN visit on site.    stated

## 2024-03-22 NOTE — OB PROVIDER TRIAGE NOTE - HISTORY OF PRESENT ILLNESS
Wound Care Center Progress Note With Procedure    Aria Kelly  AGE: 77 y.o.   GENDER: female  : 1946  EPISODE DATE:  3/22/2024     Subjective:     Chief Complaint   Patient presents with    Wound Check     Right leg          HISTORY of PRESENT ILLNESS     Aria Kelly is a 77 y.o. female who presents to the Wound Clinic for a visit for evaluation and treatment of Chronic venous  ulcer(s) of  R ankle lateral.  The condition is of moderate severity. The ulcer has been present for several years.  The underlying cause is thought to be venous stasis.  The patients care to date has included care here and care at home. She was here for care previously. The patient has significant underlying medical conditions as below.     3-: Patient looks good and wound continues to decrease in size. No issues this week after changing dressing orders last week     3-: Patient needs new plan of care today. Likely has stopped responding to her current treatment. Will try a different collagen today  May switch to a foam halo next week     **Patient down in size this week. Still has some product for us to use     2024: Patient improved again this week. Will debride cap off and continue plan of care most likely     2024: Patient is looking much better. Wound is somewhat larger after debridement. Wound bed looks great and now is beefy red and granulating. Also tolerated debridement, which has never been the case in the past. Will continue to use new product.  Culture today to ensure she is clean     2024: Patient somewhat frustrated but her wound is about half the size  She was started on the silver collagen powder and seems promising so far  Ok with compression wrap again this week.  Still painful but not developing overly fibrous cap     2024: Patient looks ok. After debridement wound is slough free and pink and clean  Yet to be seen how wound will heal  Was in a lot of pain from heavy  Pt is a 17yo  at 25w3d presented to LND triage with lower back pain and lower abdominal pain for last three days. Pt states that the pain is constant and sharp in nature.  Upon questioning pt states that for the last 3 days she also notices her abdomen getting hard and she has discomfort with urination.  Pt also reports intermittent nausea and states that she vomited once in the morning NBNB vomitus.    Comfortable, A&O 3  CVA tenderness on the R side  OB history: SAB x4, first at 13yo last 1 year ago.     Plan:  -UA, Urine culture  -Renal US  -IV Fluids

## 2024-06-10 NOTE — OB RN PATIENT PROFILE - SPIRITUAL CULTURAL, RELIGIOUS PRACTICES/VALUES, PROFILE
Called and spoke with patient. Reviewed and discussed provider message and recommendations, as noted below.  Pt voiced clear understanding and agreed with plan.   Pt will call back at later time to schedule follow up visit in recommended time frame.   No questions at this time from pt.      Zainab Leon RN  Clinical Triage/Primary Care  [facility]     none

## 2024-08-07 NOTE — OB RN DELIVERY SUMMARY - BABY A: APGAR 5 MIN MUSCLE TONE, DELIVERY
No protocol for requested medication.    Medication: ADDERALL  Last office visit date: 4/19/24  Pharmacy: [id number] - [place], [place] - [address], [address]    Order pended, routed to clinician for review.   
Prescription electronically sent to pharmacy.      
(2) well flexed